# Patient Record
Sex: FEMALE | Race: WHITE | Employment: UNEMPLOYED | URBAN - METROPOLITAN AREA
[De-identification: names, ages, dates, MRNs, and addresses within clinical notes are randomized per-mention and may not be internally consistent; named-entity substitution may affect disease eponyms.]

---

## 2017-05-12 ENCOUNTER — APPOINTMENT (OUTPATIENT)
Dept: GENERAL RADIOLOGY | Facility: CLINIC | Age: 57
DRG: 189 | End: 2017-05-12
Attending: EMERGENCY MEDICINE
Payer: COMMERCIAL

## 2017-05-12 ENCOUNTER — HOSPITAL ENCOUNTER (INPATIENT)
Facility: CLINIC | Age: 57
LOS: 3 days | Discharge: HOME | DRG: 189 | End: 2017-05-15
Attending: EMERGENCY MEDICINE | Admitting: HOSPITALIST
Payer: COMMERCIAL

## 2017-05-12 DIAGNOSIS — E11.65 TYPE 2 DIABETES MELLITUS WITH HYPERGLYCEMIA, WITHOUT LONG-TERM CURRENT USE OF INSULIN (H): Primary | ICD-10-CM

## 2017-05-12 DIAGNOSIS — R09.02 HYPOXIA: ICD-10-CM

## 2017-05-12 DIAGNOSIS — J18.9 COMMUNITY ACQUIRED PNEUMONIA: ICD-10-CM

## 2017-05-12 LAB
ALBUMIN UR-MCNC: 30 MG/DL
ANION GAP SERPL CALCULATED.3IONS-SCNC: 8 MMOL/L (ref 3–14)
APPEARANCE UR: CLEAR
BASOPHILS # BLD AUTO: 0 10E9/L (ref 0–0.2)
BASOPHILS NFR BLD AUTO: 0.2 %
BILIRUB UR QL STRIP: NEGATIVE
BUN SERPL-MCNC: 10 MG/DL (ref 7–30)
CALCIUM SERPL-MCNC: 9.2 MG/DL (ref 8.5–10.1)
CHLORIDE SERPL-SCNC: 92 MMOL/L (ref 94–109)
CO2 BLDCOV-SCNC: 23 MMOL/L (ref 21–28)
CO2 SERPL-SCNC: 28 MMOL/L (ref 20–32)
COLOR UR AUTO: YELLOW
CREAT SERPL-MCNC: 0.6 MG/DL (ref 0.52–1.04)
D DIMER PPP FEU-MCNC: 0.3 UG/ML FEU (ref 0–0.5)
DIFFERENTIAL METHOD BLD: ABNORMAL
EOSINOPHIL # BLD AUTO: 0 10E9/L (ref 0–0.7)
EOSINOPHIL NFR BLD AUTO: 0 %
ERYTHROCYTE [DISTWIDTH] IN BLOOD BY AUTOMATED COUNT: 13 % (ref 10–15)
GFR SERPL CREATININE-BSD FRML MDRD: ABNORMAL ML/MIN/1.7M2
GLUCOSE BLDC GLUCOMTR-MCNC: 259 MG/DL (ref 70–99)
GLUCOSE BLDC GLUCOMTR-MCNC: 261 MG/DL (ref 70–99)
GLUCOSE BLDC GLUCOMTR-MCNC: 324 MG/DL (ref 70–99)
GLUCOSE BLDC GLUCOMTR-MCNC: 325 MG/DL (ref 70–99)
GLUCOSE BLDC GLUCOMTR-MCNC: 397 MG/DL (ref 70–99)
GLUCOSE SERPL-MCNC: 352 MG/DL (ref 70–99)
GLUCOSE UR STRIP-MCNC: >499 MG/DL
HCT VFR BLD AUTO: 34.1 % (ref 35–47)
HGB BLD-MCNC: 11.5 G/DL (ref 11.7–15.7)
HGB UR QL STRIP: NEGATIVE
IMM GRANULOCYTES # BLD: 0.4 10E9/L (ref 0–0.4)
IMM GRANULOCYTES NFR BLD: 1.5 %
INTERPRETATION ECG - MUSE: NORMAL
KETONES UR STRIP-MCNC: 20 MG/DL
LACTATE BLD-SCNC: 1.4 MMOL/L (ref 0.7–2.1)
LACTATE BLD-SCNC: 1.9 MMOL/L (ref 0.7–2.1)
LEUKOCYTE ESTERASE UR QL STRIP: ABNORMAL
LYMPHOCYTES # BLD AUTO: 1.5 10E9/L (ref 0.8–5.3)
LYMPHOCYTES NFR BLD AUTO: 6.3 %
MCH RBC QN AUTO: 29.3 PG (ref 26.5–33)
MCHC RBC AUTO-ENTMCNC: 33.7 G/DL (ref 31.5–36.5)
MCV RBC AUTO: 87 FL (ref 78–100)
MONOCYTES # BLD AUTO: 2 10E9/L (ref 0–1.3)
MONOCYTES NFR BLD AUTO: 8.7 %
MUCOUS THREADS #/AREA URNS LPF: PRESENT /LPF
NEUTROPHILS # BLD AUTO: 19.4 10E9/L (ref 1.6–8.3)
NEUTROPHILS NFR BLD AUTO: 83.3 %
NITRATE UR QL: NEGATIVE
NRBC # BLD AUTO: 0 10*3/UL
NRBC BLD AUTO-RTO: 0 /100
PCO2 BLDV: 32 MM HG (ref 40–50)
PH BLDV: 7.46 PH (ref 7.32–7.43)
PH UR STRIP: 6 PH (ref 5–7)
PLATELET # BLD AUTO: 259 10E9/L (ref 150–450)
PO2 BLDV: 48 MM HG (ref 25–47)
POTASSIUM SERPL-SCNC: 4.6 MMOL/L (ref 3.4–5.3)
PROCALCITONIN SERPL-MCNC: 1.63 NG/ML
RBC # BLD AUTO: 3.93 10E12/L (ref 3.8–5.2)
RBC #/AREA URNS AUTO: <1 /HPF (ref 0–2)
SAO2 % BLDV FROM PO2: 86 %
SODIUM SERPL-SCNC: 128 MMOL/L (ref 133–144)
SP GR UR STRIP: 1.03 (ref 1–1.03)
SQUAMOUS #/AREA URNS AUTO: <1 /HPF (ref 0–1)
TROPONIN I SERPL-MCNC: NORMAL UG/L (ref 0–0.04)
URN SPEC COLLECT METH UR: ABNORMAL
UROBILINOGEN UR STRIP-MCNC: 0 MG/DL (ref 0–2)
WBC # BLD AUTO: 23.3 10E9/L (ref 4–11)
WBC #/AREA URNS AUTO: 9 /HPF (ref 0–2)

## 2017-05-12 PROCEDURE — 85379 FIBRIN DEGRADATION QUANT: CPT | Performed by: EMERGENCY MEDICINE

## 2017-05-12 PROCEDURE — 80048 BASIC METABOLIC PNL TOTAL CA: CPT | Performed by: EMERGENCY MEDICINE

## 2017-05-12 PROCEDURE — 25000128 H RX IP 250 OP 636: Performed by: EMERGENCY MEDICINE

## 2017-05-12 PROCEDURE — 94640 AIRWAY INHALATION TREATMENT: CPT | Mod: 76

## 2017-05-12 PROCEDURE — 25000132 ZZH RX MED GY IP 250 OP 250 PS 637: Performed by: HOSPITALIST

## 2017-05-12 PROCEDURE — 40000809 ZZH STATISTIC NO DOCUMENTATION TO SUPPORT CHARGE

## 2017-05-12 PROCEDURE — 99285 EMERGENCY DEPT VISIT HI MDM: CPT | Mod: 25

## 2017-05-12 PROCEDURE — 71020 XR CHEST 2 VW: CPT

## 2017-05-12 PROCEDURE — 83605 ASSAY OF LACTIC ACID: CPT | Performed by: HOSPITALIST

## 2017-05-12 PROCEDURE — 93005 ELECTROCARDIOGRAM TRACING: CPT

## 2017-05-12 PROCEDURE — 25000131 ZZH RX MED GY IP 250 OP 636 PS 637: Performed by: HOSPITALIST

## 2017-05-12 PROCEDURE — 84145 PROCALCITONIN (PCT): CPT | Performed by: HOSPITALIST

## 2017-05-12 PROCEDURE — 40000275 ZZH STATISTIC RCP TIME EA 10 MIN

## 2017-05-12 PROCEDURE — 25000125 ZZHC RX 250: Performed by: HOSPITALIST

## 2017-05-12 PROCEDURE — 85025 COMPLETE CBC W/AUTO DIFF WBC: CPT | Performed by: EMERGENCY MEDICINE

## 2017-05-12 PROCEDURE — 83605 ASSAY OF LACTIC ACID: CPT

## 2017-05-12 PROCEDURE — 99223 1ST HOSP IP/OBS HIGH 75: CPT | Mod: AI | Performed by: HOSPITALIST

## 2017-05-12 PROCEDURE — 12000007 ZZH R&B INTERMEDIATE

## 2017-05-12 PROCEDURE — 25000132 ZZH RX MED GY IP 250 OP 250 PS 637: Performed by: EMERGENCY MEDICINE

## 2017-05-12 PROCEDURE — 87040 BLOOD CULTURE FOR BACTERIA: CPT | Performed by: HOSPITALIST

## 2017-05-12 PROCEDURE — 36415 COLL VENOUS BLD VENIPUNCTURE: CPT | Performed by: HOSPITALIST

## 2017-05-12 PROCEDURE — 00000146 ZZHCL STATISTIC GLUCOSE BY METER IP

## 2017-05-12 PROCEDURE — 94640 AIRWAY INHALATION TREATMENT: CPT

## 2017-05-12 PROCEDURE — 96375 TX/PRO/DX INJ NEW DRUG ADDON: CPT

## 2017-05-12 PROCEDURE — 25000128 H RX IP 250 OP 636: Performed by: HOSPITALIST

## 2017-05-12 PROCEDURE — 96361 HYDRATE IV INFUSION ADD-ON: CPT

## 2017-05-12 PROCEDURE — 25000125 ZZHC RX 250: Performed by: EMERGENCY MEDICINE

## 2017-05-12 PROCEDURE — 96365 THER/PROPH/DIAG IV INF INIT: CPT

## 2017-05-12 PROCEDURE — 82803 BLOOD GASES ANY COMBINATION: CPT

## 2017-05-12 PROCEDURE — 84484 ASSAY OF TROPONIN QUANT: CPT | Performed by: EMERGENCY MEDICINE

## 2017-05-12 PROCEDURE — 81001 URINALYSIS AUTO W/SCOPE: CPT | Performed by: EMERGENCY MEDICINE

## 2017-05-12 RX ORDER — SODIUM CHLORIDE 9 MG/ML
INJECTION, SOLUTION INTRAVENOUS CONTINUOUS
Status: DISCONTINUED | OUTPATIENT
Start: 2017-05-12 | End: 2017-05-13

## 2017-05-12 RX ORDER — POTASSIUM CHLORIDE 1500 MG/1
20-40 TABLET, EXTENDED RELEASE ORAL
Status: DISCONTINUED | OUTPATIENT
Start: 2017-05-12 | End: 2017-05-15 | Stop reason: HOSPADM

## 2017-05-12 RX ORDER — ACETAMINOPHEN 325 MG/1
650 TABLET ORAL EVERY 4 HOURS PRN
Status: DISCONTINUED | OUTPATIENT
Start: 2017-05-12 | End: 2017-05-15 | Stop reason: HOSPADM

## 2017-05-12 RX ORDER — GUAIFENESIN 600 MG/1
1200 TABLET, EXTENDED RELEASE ORAL 2 TIMES DAILY
Status: DISCONTINUED | OUTPATIENT
Start: 2017-05-12 | End: 2017-05-15 | Stop reason: HOSPADM

## 2017-05-12 RX ORDER — AZITHROMYCIN 250 MG/1
500 TABLET, FILM COATED ORAL ONCE
Status: COMPLETED | OUTPATIENT
Start: 2017-05-12 | End: 2017-05-12

## 2017-05-12 RX ORDER — NALOXONE HYDROCHLORIDE 0.4 MG/ML
.1-.4 INJECTION, SOLUTION INTRAMUSCULAR; INTRAVENOUS; SUBCUTANEOUS
Status: DISCONTINUED | OUTPATIENT
Start: 2017-05-12 | End: 2017-05-15 | Stop reason: HOSPADM

## 2017-05-12 RX ORDER — AZITHROMYCIN 250 MG/1
250 TABLET, FILM COATED ORAL DAILY
Status: DISCONTINUED | OUTPATIENT
Start: 2017-05-13 | End: 2017-05-15

## 2017-05-12 RX ORDER — ASPIRIN 81 MG/1
81 TABLET ORAL DAILY
Status: DISCONTINUED | OUTPATIENT
Start: 2017-05-13 | End: 2017-05-15 | Stop reason: HOSPADM

## 2017-05-12 RX ORDER — LIDOCAINE 40 MG/G
CREAM TOPICAL
Status: DISCONTINUED | OUTPATIENT
Start: 2017-05-12 | End: 2017-05-12

## 2017-05-12 RX ORDER — ASPIRIN 81 MG/1
324 TABLET, CHEWABLE ORAL ONCE
Status: COMPLETED | OUTPATIENT
Start: 2017-05-12 | End: 2017-05-12

## 2017-05-12 RX ORDER — METHOCARBAMOL 500 MG/1
500 TABLET, FILM COATED ORAL 4 TIMES DAILY PRN
Status: DISCONTINUED | OUTPATIENT
Start: 2017-05-12 | End: 2017-05-15 | Stop reason: HOSPADM

## 2017-05-12 RX ORDER — PROCHLORPERAZINE 25 MG
25 SUPPOSITORY, RECTAL RECTAL EVERY 12 HOURS PRN
Status: DISCONTINUED | OUTPATIENT
Start: 2017-05-12 | End: 2017-05-15 | Stop reason: HOSPADM

## 2017-05-12 RX ORDER — CEFTRIAXONE 2 G/1
2 INJECTION, POWDER, FOR SOLUTION INTRAMUSCULAR; INTRAVENOUS EVERY 24 HOURS
Status: DISCONTINUED | OUTPATIENT
Start: 2017-05-13 | End: 2017-05-15

## 2017-05-12 RX ORDER — HYDROMORPHONE HYDROCHLORIDE 1 MG/ML
0.5 INJECTION, SOLUTION INTRAMUSCULAR; INTRAVENOUS; SUBCUTANEOUS
Status: COMPLETED | OUTPATIENT
Start: 2017-05-12 | End: 2017-05-12

## 2017-05-12 RX ORDER — EPINEPHRINE 0.3 MG/.3ML
0.3 INJECTION SUBCUTANEOUS PRN
COMMUNITY

## 2017-05-12 RX ORDER — POTASSIUM CHLORIDE 29.8 MG/ML
20 INJECTION INTRAVENOUS
Status: DISCONTINUED | OUTPATIENT
Start: 2017-05-12 | End: 2017-05-15 | Stop reason: HOSPADM

## 2017-05-12 RX ORDER — LEVALBUTEROL INHALATION SOLUTION 1.25 MG/3ML
1.25 SOLUTION RESPIRATORY (INHALATION) 4 TIMES DAILY
Status: DISCONTINUED | OUTPATIENT
Start: 2017-05-12 | End: 2017-05-15 | Stop reason: HOSPADM

## 2017-05-12 RX ORDER — DEXTROSE MONOHYDRATE 25 G/50ML
25-50 INJECTION, SOLUTION INTRAVENOUS
Status: DISCONTINUED | OUTPATIENT
Start: 2017-05-12 | End: 2017-05-15 | Stop reason: HOSPADM

## 2017-05-12 RX ORDER — IPRATROPIUM BROMIDE AND ALBUTEROL SULFATE 2.5; .5 MG/3ML; MG/3ML
3 SOLUTION RESPIRATORY (INHALATION) ONCE
Status: COMPLETED | OUTPATIENT
Start: 2017-05-12 | End: 2017-05-12

## 2017-05-12 RX ORDER — LORATADINE 10 MG/1
10 TABLET ORAL DAILY
Status: DISCONTINUED | OUTPATIENT
Start: 2017-05-12 | End: 2017-05-15 | Stop reason: HOSPADM

## 2017-05-12 RX ORDER — NICOTINE POLACRILEX 4 MG
15-30 LOZENGE BUCCAL
Status: DISCONTINUED | OUTPATIENT
Start: 2017-05-12 | End: 2017-05-15 | Stop reason: HOSPADM

## 2017-05-12 RX ORDER — LORATADINE 10 MG/1
10 TABLET ORAL DAILY PRN
COMMUNITY

## 2017-05-12 RX ORDER — METOPROLOL SUCCINATE 100 MG/1
100 TABLET, EXTENDED RELEASE ORAL DAILY
Status: DISCONTINUED | OUTPATIENT
Start: 2017-05-12 | End: 2017-05-12

## 2017-05-12 RX ORDER — POTASSIUM CHLORIDE 7.45 MG/ML
10 INJECTION INTRAVENOUS
Status: DISCONTINUED | OUTPATIENT
Start: 2017-05-12 | End: 2017-05-15 | Stop reason: HOSPADM

## 2017-05-12 RX ORDER — ONDANSETRON 4 MG/1
4 TABLET, ORALLY DISINTEGRATING ORAL EVERY 6 HOURS PRN
Status: DISCONTINUED | OUTPATIENT
Start: 2017-05-12 | End: 2017-05-15 | Stop reason: HOSPADM

## 2017-05-12 RX ORDER — CLOTRIMAZOLE AND BETAMETHASONE DIPROPIONATE 10; .64 MG/G; MG/G
CREAM TOPICAL 2 TIMES DAILY PRN
COMMUNITY

## 2017-05-12 RX ORDER — PROCHLORPERAZINE MALEATE 5 MG
5-10 TABLET ORAL EVERY 6 HOURS PRN
Status: DISCONTINUED | OUTPATIENT
Start: 2017-05-12 | End: 2017-05-15 | Stop reason: HOSPADM

## 2017-05-12 RX ORDER — POTASSIUM CHLORIDE 1.5 G/1.58G
20-40 POWDER, FOR SOLUTION ORAL
Status: DISCONTINUED | OUTPATIENT
Start: 2017-05-12 | End: 2017-05-15 | Stop reason: HOSPADM

## 2017-05-12 RX ORDER — MAGNESIUM SULFATE HEPTAHYDRATE 40 MG/ML
4 INJECTION, SOLUTION INTRAVENOUS EVERY 4 HOURS PRN
Status: DISCONTINUED | OUTPATIENT
Start: 2017-05-12 | End: 2017-05-15 | Stop reason: HOSPADM

## 2017-05-12 RX ORDER — CEFTRIAXONE 2 G/1
2 INJECTION, POWDER, FOR SOLUTION INTRAMUSCULAR; INTRAVENOUS ONCE
Status: COMPLETED | OUTPATIENT
Start: 2017-05-12 | End: 2017-05-12

## 2017-05-12 RX ORDER — ONDANSETRON 2 MG/ML
4 INJECTION INTRAMUSCULAR; INTRAVENOUS EVERY 6 HOURS PRN
Status: DISCONTINUED | OUTPATIENT
Start: 2017-05-12 | End: 2017-05-15 | Stop reason: HOSPADM

## 2017-05-12 RX ORDER — POLYETHYLENE GLYCOL 3350 17 G/17G
17 POWDER, FOR SOLUTION ORAL DAILY PRN
Status: DISCONTINUED | OUTPATIENT
Start: 2017-05-12 | End: 2017-05-15 | Stop reason: HOSPADM

## 2017-05-12 RX ORDER — GLIPIZIDE 10 MG/1
10 TABLET ORAL
Status: DISCONTINUED | OUTPATIENT
Start: 2017-05-13 | End: 2017-05-15 | Stop reason: HOSPADM

## 2017-05-12 RX ORDER — SODIUM CHLORIDE 9 MG/ML
1000 INJECTION, SOLUTION INTRAVENOUS CONTINUOUS
Status: DISCONTINUED | OUTPATIENT
Start: 2017-05-12 | End: 2017-05-12

## 2017-05-12 RX ORDER — POTASSIUM CL/LIDO/0.9 % NACL 10MEQ/0.1L
10 INTRAVENOUS SOLUTION, PIGGYBACK (ML) INTRAVENOUS
Status: DISCONTINUED | OUTPATIENT
Start: 2017-05-12 | End: 2017-05-15 | Stop reason: HOSPADM

## 2017-05-12 RX ORDER — IPRATROPIUM BROMIDE AND ALBUTEROL SULFATE 2.5; .5 MG/3ML; MG/3ML
1 SOLUTION RESPIRATORY (INHALATION) EVERY 6 HOURS PRN
COMMUNITY

## 2017-05-12 RX ORDER — AMOXICILLIN 250 MG
1-2 CAPSULE ORAL 2 TIMES DAILY PRN
Status: DISCONTINUED | OUTPATIENT
Start: 2017-05-12 | End: 2017-05-15 | Stop reason: HOSPADM

## 2017-05-12 RX ADMIN — INSULIN ASPART 3 UNITS: 100 INJECTION, SOLUTION INTRAVENOUS; SUBCUTANEOUS at 19:41

## 2017-05-12 RX ADMIN — IPRATROPIUM BROMIDE AND ALBUTEROL SULFATE 3 ML: .5; 3 SOLUTION RESPIRATORY (INHALATION) at 12:12

## 2017-05-12 RX ADMIN — LEVALBUTEROL 1.25 MG: 1.25 SOLUTION RESPIRATORY (INHALATION) at 19:13

## 2017-05-12 RX ADMIN — LORATADINE 10 MG: 10 TABLET ORAL at 18:40

## 2017-05-12 RX ADMIN — ASPIRIN 81 MG CHEWABLE TABLET 324 MG: 81 TABLET CHEWABLE at 12:32

## 2017-05-12 RX ADMIN — SODIUM CHLORIDE: 9 INJECTION, SOLUTION INTRAVENOUS at 18:36

## 2017-05-12 RX ADMIN — ACETAMINOPHEN 650 MG: 325 TABLET, FILM COATED ORAL at 18:40

## 2017-05-12 RX ADMIN — AZITHROMYCIN 500 MG: 250 TABLET, FILM COATED ORAL at 15:22

## 2017-05-12 RX ADMIN — Medication 0.5 MG: at 12:32

## 2017-05-12 RX ADMIN — INSULIN GLARGINE 20 UNITS: 100 INJECTION, SOLUTION SUBCUTANEOUS at 21:45

## 2017-05-12 RX ADMIN — GUAIFENESIN 1200 MG: 600 TABLET, EXTENDED RELEASE ORAL at 21:44

## 2017-05-12 RX ADMIN — IPRATROPIUM BROMIDE AND ALBUTEROL SULFATE 3 ML: .5; 3 SOLUTION RESPIRATORY (INHALATION) at 17:23

## 2017-05-12 RX ADMIN — SODIUM CHLORIDE 1000 ML: 9 INJECTION, SOLUTION INTRAVENOUS at 12:32

## 2017-05-12 RX ADMIN — SODIUM CHLORIDE 500 ML: 9 INJECTION, SOLUTION INTRAVENOUS at 15:22

## 2017-05-12 RX ADMIN — CEFTRIAXONE 2 G: 2 INJECTION, POWDER, FOR SOLUTION INTRAMUSCULAR; INTRAVENOUS at 15:22

## 2017-05-12 ASSESSMENT — ENCOUNTER SYMPTOMS
COUGH: 1
VOMITING: 1
HEADACHES: 1
FEVER: 0
DIARRHEA: 1
CHILLS: 1
JOINT SWELLING: 1
SORE THROAT: 1
NAUSEA: 1

## 2017-05-12 NOTE — IP AVS SNAPSHOT
[place] [address] [facility]    [address]    [facility] [address]    Phone:  [phone]    Fax:  [phone]                                       After Visit Summary   5/12/2017    Lauren Doe    MRN: [id number]           After Visit Summary Signature Page     I have received my discharge instructions, and my questions have been answered. I have discussed any challenges I see with this plan with the nurse or doctor.    ..........................................................................................................................................  Patient/Patient Representative Signature      ..........................................................................................................................................  Patient Representative Print Name and Relationship to Patient    ..................................................               ................................................  Date                                            Time    ..........................................................................................................................................  Reviewed by Signature/Title    ...................................................              ..............................................  Date                                                            Time

## 2017-05-12 NOTE — PROVIDER NOTIFICATION
"Paged on-call MD, \"Pt temp on admission was 104.5 Axillary. Ice packs applied and Tylenol given. HR in 120-130s\"  "

## 2017-05-12 NOTE — ED PROVIDER NOTES
"  History     Chief Complaint:  Cough      The history is provided by the patient.      Lauren Doe is a 57 year old female with history of asthma and diabetes who presents with cough.  The patent reports onset of a cough a week and a half prior.  She was seen by her primary due to fluctuations in blood sugar and was started on prednisone a week prior, last dose was yesterday.  Last night around 2100 she noticed pain in her jaw and a headache and then left sided chest pain due to the persistent cough despite prednisone, inhaler, and nebulizer treatments.  Pain has persisted constantly since and blood sugar was again elevated today at 384 with nausea.  Currently she rates her pain at 10/10 in severity described as \"it's almost like a muscle is pulled\".  Pain is aggravated with movement and coughing.  Cough has been productive.  She also endorses swelling in her knees, congestion, left sided sore throat, and occasional diarrhea.  She denies measured fevers but has had chills.  Grandson staying with her is sick with recent strep and currently febrile at 100; patient was tested for strep and negative.  She denies urinary symptoms, calf swelling, or other complaint.     Per chart review patient was seen 5/1/17 and provided the prescription for prednisone.      CARDIAC RISK FACTORS:  Sex:    F  Tobacco:   Former smoker, QD 2010  Hypertension:   Pos  Hyperlipidemia:  Neg  Diabetes:   Pos  Family History:  Neg    PE/DVT RISK FACTORS:  Sex:    F  Hormones:   Neg  Tobacco:   Former smoker, QD 2010  Cancer:   Neg  Travel:   Neg  Surgery:   Neg  Other immobilization: Neg  Personal history:  Neg  Family history:  \"Maybe my mom\"     Allergies:  Bee Venom  Codeine Sulfate  Fluconazole      Medications:    Coenzyme Q10  Metformin  Glipizide  Gabapentin  Metoprolol  Fluticasone  MVI    Past Medical History:    Asthma  Diabetes  HTN  IBS  Neuropathy  PONV    Past Surgical History:    Libra  Lap tubal ligation  ЕЛЕНА & BSO    Family " "History:    History reviewed. No pertinent family history.      Social History:  Presents alone   Tobacco use: Former smoker, QD 2010  Alcohol use: 4 weekly  PCP: Emily Saldana    Marital Status:  Single       Review of Systems   Constitutional: Positive for chills. Negative for fever.   HENT: Positive for congestion and sore throat.    Respiratory: Positive for cough.    Cardiovascular: Positive for chest pain. Negative for leg swelling.   Gastrointestinal: Positive for diarrhea, nausea and vomiting.   Musculoskeletal: Positive for joint swelling.   Neurological: Positive for headaches.   All other systems reviewed and are negative.      Physical Exam     Patient Vitals for the past 24 hrs:   BP Temp Temp src Pulse Heart Rate Resp SpO2 Height Weight   05/12/17 1622 - - - - - - 93 % - -   05/12/17 1615 134/67 - - - - - - - -   05/12/17 1600 122/75 - - - - - (!) 88 % - -   05/12/17 1545 124/81 - - - - - (!) 86 % - -   05/12/17 1530 131/75 - - - - - (!) 89 % - -   05/12/17 1500 121/73 - - - - - 94 % - -   05/12/17 1445 111/63 - - - - - 94 % - -   05/12/17 1433 104/71 - - - 99 22 95 % - -   05/12/17 1430 106/63 - - - - - 96 % - -   05/12/17 1415 110/61 - - - - - 93 % - -   05/12/17 1400 102/50 - - - - - 98 % - -   05/12/17 1345 104/54 - - - - - 96 % - -   05/12/17 1330 107/56 - - - - - 95 % - -   05/12/17 1315 99/55 - - - - - 97 % - -   05/12/17 1245 118/59 - - - - - 91 % - -   05/12/17 1230 120/68 - - - - - 92 % - -   05/12/17 1215 123/70 - - - - - 95 % - -   05/12/17 1212 - - - - - - 94 % - -   05/12/17 1130 114/68 - - - - - (!) 86 % - -   05/12/17 1116 - - - - - - - 1.575 m (5' 2\") -   05/12/17 1115 147/81 97.9  F (36.6  C) Oral 125 - 20 94 % 2.108 m (6' 11\") 77.1 kg (170 lb)      Physical Exam    Nursing note and vitals reviewed.    Constitutional: Pleasant and well groomed.  Appears very uncomfortable with movement and coughing.           HENT:    Mouth/Throat: Mild erythema of posterior oropharynx.  Oral " mucosa moist.    Eyes: Conjunctivae normal are normal. No scleral icterus.    Neck: Neck supple. Small symmetric anterior cervical nodes.   Cardiovascular: Normal rate, regular rhythm and intact distal pulses.    Pulmonary/Chest: Tachypneic with diminished breath sounds throughout. No wheezing.   Abdominal: Soft.  No distension. There is no tenderness.   Musculoskeletal:  No edema, No calf tenderness, Left chest wall tenderness.       Neurological:Alert. Coordination normal.   Skin: Skin is warm and dry. No rash.  No skin lesions in area of tenderness.   Psychiatric: Normal mood and affect.        Emergency Department Course   ECG (11:18:30):  Rate 123 bpm. WY interval 128. QRS duration 82. QT/QTc 322/460. P-R-T axes 65 69 58.  Sinus tachycardia.  Otherwise normal ECG.  Agree with computer. Interpreted at 1119 by Dilcia Key MD.     Imaging:  Radiographic findings were communicated with the patient who voiced understanding of the findings.    XR Chest:  IMPRESSION: Streaky bibasilar opacity suggests atelectasis. Pneumonia could appear similar.    DENILSON ACKERMAN MD    Results per radiology.     Laboratory:  CBC: WBC 23.3 (H), HGB 11.5 (L) ow WNL ()   BMP:  (L), Chloride 92 (L), Glucose 352 (H) ow WNL (Creatinine 0.60)     Recent Labs  Lab 05/12/17  1619 05/12/17  1351 05/12/17  1215 05/12/17  1120   GLC  --   --  352*  --    * 325*  --  397*      Venous Blood Gas    Recent Labs  Lab 05/12/17  1655   PHV 7.46*   PCO2V 32*   PO2V 48*   HCO3V 23   Lactic 1.9        1215: Troponin: <0.015   D-dimer: 0.3     Interventions:  1212: Duoneb 3 mL nebulization    1232: NS 1L IV Bolus   1232: Dilaudid 0.5 mg IV  1522: Rocephin 2 g IV  1522: Azithromycin 500 mg PO  1522: NS 0.5L IV Bolus    1723: Duoneb 3 mL nebulization    The patient's symptoms were partially improved with parenteral narcotics.    Emergency Department Course:  Past medical records, nursing notes, and vitals reviewed.  1114: I  performed an exam of the patient and obtained history as documented above.   Above workup undertaken.  1430: I rechecked the patient. Explained findings to patient.  Heart rate in the 90's and blood pressure is normal.   I personally reviewed the laboratory results with the Patient and answered all related questions prior to admission.    Findings and plan explained to the Patient who consents to admission.     1700: Discussed the patient with Dr. Flores, who will admit the patient to a medical bed for further monitoring, evaluation, and treatment.      Impression & Plan    Medical Decision Making:  Lauren Doe is a 57 year old female presenting with a week and a half of cough.  It has been non productive and without measured fevers though has felt hot and cold.  At the beginning of the month, at the onset of the symptoms, she was started on a week of steroids which she reports did not improve her symptoms and she feels as though the cough has worsened.  She subsequently developed pain as described above.  The chest pain very much seems musculoskeletal though I did consider pulmonary embolism and pleurisy as well as pneumonia.  ED evaluation is as noted above, she did have significant hyperglycemia and some hyponatremia which is likely pseudohyponatremia secondary to hyperglycemia.  She was resting more comfortably but did develop hypoxia in the emergency department even prior to receiving pain medication which has persisted.  She received antibiotics and has been hemodynamically stable.  Her tachycardia improved with IV fluids.  Glucose is improving although she still has some hyperglycemia.  Due to the persistent hypoxia I have repeated nebulizer treatments and plan to admit for continued evaluation, monitoring, and management.  I have spoke with Dr. Flores who has accepted ongoing care of the patient.     Diagnosis:      ICD-10-CM    1. Community acquired pneumonia J18.9 Glucose by meter     Glucose by meter      Lactic acid whole blood     ISTAT gases lactate rhina POCT     ISTAT gases lactate rhina POCT     Procalcitonin     Blood culture     Blood culture     Lactic acid whole blood     Glucose by meter     Glucose by meter     Glucose by meter     Glucose by meter     Hemoglobin A1c     Basic metabolic panel     CBC with platelets     Glucose by meter     Glucose by meter     Glucose by meter     Glucose by meter     Magnesium     Magnesium     Glucose by meter     Glucose by meter     Glucose by meter     Glucose by meter     Glucose by meter     Glucose by meter     CBC with platelets     Glucose by meter     Glucose by meter     Lactic acid level STAT     Glucose by meter     Glucose by meter     CANCELED: Lactic acid whole blood     CANCELED: Magnesium   2. Hypoxia R09.02        Disposition:  Admitted to hospitalist service.       Aleks Garcia  5/12/2017   [facility] EMERGENCY DEPARTMENT    I, Aleks Garcia, am serving as a scribe at 11:14 AM on 5/12/2017 to document services personally performed by Dilcia Key MD based on my observations and the provider's statements to me.       Dilcia Key MD  05/14/17 1014

## 2017-05-12 NOTE — LETTER
Transition Communication Hand-off for Care Transitions to Next Level of Care Provider    Name: Lauren Doe  MRN #: [id number]  Primary Care Provider: Emily Saldana  Primary Care MD Name: Dr. Emily Saldana  Primary Clinic: [facility] [address] [place] [address]  Primary Care Clinic Name: [facility]  Reason for Hospitalization:  Community acquired pneumonia [J18.9]  Hypoxia [R09.02]  Admit Date/Time: 5/12/2017 11:09 AM  Discharge Date: 5/15/17  Payor Source: Payor: BCBS / Plan: BCBS OUT OF STATE / Product Type: Indemnity /     Readmission Assessment Measure (PERCY) Risk Score/category: LOW           Reason for Communication Hand-off Referral: Admission diagnoses: PN  Other uncontrolled DM    Discharge Plan:       Concern for non-adherence with plan of care:   NO  Discharge Needs Assessment:  Needs       Most Recent Value    Equipment Currently Used at Home none    Transportation Available car, family or friend will provide    # of Referrals Placed by [facility] Care Coordination, Scheduled Follow-up appointments          Already enrolled in Tele-monitoring program and name of program:  NA  Follow-up specialty is recommended: Yes    Follow-up plan:  No future appointments.    Any outstanding tests or procedures:              Activity      Your activity upon discharge: activity as tolerated            Diet      Follow this diet upon discharge: Orders Placed This Encounter       Combination Diet Regular Diet Adult; 8622-6784 Calories: Moderate Consistent CHO (4-6 CHO units/meal)                     Follow-up Appointments      Follow-up and recommended labs and tests       Follow up in the next week with your primary care doctor as well as diabetic educator.                     Further instructions from your care team      You have a follow up appointment on Thursday, May 18, 2017 at 9:40am with Dr. Emily Saldana at the [facility] in [place].      1. Follow  up with the diabetic educators within one week.  2. Take Lantus each night. Check your glucose three times daily and record these readings, bring these to your appointment.         Key Recommendations:  PNA, a1c 9.9 as of 5/13/17.  Needs f/u    Rosa M Carmichael [phone]  Sent via [employer] to Dr Saldana's RN CTS     AVS/Discharge Summary is the source of truth; this is a helpful guide for improved communication of patient story

## 2017-05-12 NOTE — IP AVS SNAPSHOT
MRN:[id number]                      After Visit Summary   5/12/2017    Lauren Doe    MRN: [id number]           Thank you!     Thank you for choosing [facility] for your care. Our goal is always to provide you with excellent care. Hearing back from our patients is one way we can continue to improve our services. Please take a few minutes to complete the written survey that you may receive in the mail after you visit. If you would like to speak to someone directly about your visit please contact Patient Relations at [phone]. Thank you!          Patient Information     Date Of Birth          1960        Designated Caregiver       Most Recent Value    Caregiver    Will someone help with your care after discharge? yes    Name of designated caregiver Karyn Agosto    Phone number of caregiver [phone]    Caregiver address [address] [place] [address]      About your hospital stay     You were admitted on:  May 12, 2017 You last received care in the:  [place] [address] [facility]    You were discharged on:  May 15, 2017        Reason for your hospital stay       You were hospitalized for pneumonia.                  Who to Call     For medical emergencies, please call 911.  For non-urgent questions about your medical care, please call your primary care provider or clinic, [phone]          Attending Provider     Provider Specialty    Dilcia Key MD Emergency Medicine    Mark, Eduardo Ordaz DO Internal Medicine       Primary Care Provider Office Phone # Fax #    Emily Saldana [phone]       [facility] [address] [place] [address]        After Care Instructions     Activity       Your activity upon discharge: activity as tolerated            Diet       Follow this diet upon discharge: Orders Placed This Encounter      Combination Diet Regular Diet Adult; 4178-9777 Calories: Moderate Consistent CHO  "(4-6 CHO units/meal)                  Follow-up Appointments     Follow-up and recommended labs and tests        Follow up in the next week with your primary care doctor as well as diabetic educator.                  Further instructions from your care team       You have a follow up appointment on Thursday, May 18, 2017 at 9:40am with Dr. Emily Saldana at the [facility] in [place].     1. Follow up with the diabetic educators within one week.  2. Take Lantus each night.  Check your glucose three times daily and record these readings, bring these to your appointment.    Pending Results     Date and Time Order Name Status Description    5/12/2017 1841 Blood culture Preliminary     5/12/2017 1841 Blood culture Preliminary             Statement of Approval     Ordered          05/15/17 0947  I have reviewed and agree with all the recommendations and orders detailed in this document.  EFFECTIVE NOW     Approved and electronically signed by:  Jessica Park MD             Admission Information     Date & Time Provider Department Dept. Phone    5/12/2017 Eduardo Flores DO [place] [address] [facility] [phone]      Your Vitals Were     Blood Pressure Pulse Temperature Respirations Height Weight    144/77 (BP Location: Right arm) 126 98.5  F (36.9  C) (Oral) 16 1.575 m (5' 2\") 78.3 kg (172 lb 11.2 oz)    Last Period Pulse Oximetry BMI (Body Mass Index)             08/25/2012 94% 31.59 kg/m2         [email or web address] Information     [employer] lets you send messages to your doctor, view your test results, renew your prescriptions, schedule appointments and more. To sign up, go to [email or web address] . Click on \"Log in\" on the left side of the screen, which will take you to the Welcome page. Then click on \"Sign up Now\" on the right side of the page.     You will be asked to enter the access code listed below, as well as some personal information. Please follow the directions to create your username " and password.     Your access code is: [email or web address]  Expires: 2017  1:22 PM     Your access code will  in 90 days. If you need help or a new code, please call your [facility] or [phone].        Care EveryWhere ID     This is your Care EveryWhere ID. This could be used by other organizations to access your [facility] medical records  [id number]           Review of your medicines      START taking        Dose / Directions    amoxicillin 500 MG capsule   Commonly known as:  AMOXIL   Indication:  Community Acquired Pneumonia   Used for:  Community acquired pneumonia        Dose:  500 mg   Take 1 capsule (500 mg) by mouth every 8 hours   Quantity:  11 capsule   Refills:  0       azithromycin 250 MG tablet   Commonly known as:  ZITHROMAX   Used for:  Community acquired pneumonia        Dose:  250 mg   Start taking on:  2017   Take 1 tablet (250 mg) by mouth daily for 1 day   Quantity:  1 tablet   Refills:  0       guaiFENesin 20 mg/mL Soln solution   Commonly known as:  ROBITUSSIN   Used for:  Community acquired pneumonia        Dose:  10 mL   Take 10 mLs by mouth every 4 hours as needed for cough   Quantity:  473 mL   Refills:  0       insulin glargine 100 UNIT/ML injection   Commonly known as:  LANTUS SOLOSTAR   Used for:  Type 2 diabetes mellitus with hyperglycemia, without long-term current use of insulin (H)        Dose:  25 Units   Inject 25 Units Subcutaneous At Bedtime Lantus Solostar Pen   Quantity:  9 mL   Refills:  0       insulin pen needle 31G X 8 MM   Used for:  Type 2 diabetes mellitus with hyperglycemia, without long-term current use of insulin (H)        1 Box of 100 insulin pen needles to be dispensed with every insulin pen prescription   Quantity:  100 each   Refills:  0         CONTINUE these medicines which have NOT CHANGED        Dose / Directions    albuterol 108 (90 BASE) MCG/ACT Inhaler   Commonly known as:  PROAIR HFA/PROVENTIL HFA/VENTOLIN HFA   Used for:  Community  acquired pneumonia        Dose:  2 puff   Inhale 2 puffs into the lungs every 6 hours   Quantity:  1 Inhaler   Refills:  0       clotrimazole-betamethasone cream   Commonly known as:  LOTRISONE        Apply topically 2 times daily as needed   Refills:  0       COQ10 PO        Dose:  1 tablet   Take 1 tablet by mouth every evening   Refills:  0       EPINEPHrine 0.3 MG/0.3ML injection        Dose:  0.3 mg   Inject 0.3 mg into the muscle as needed for anaphylaxis   Refills:  0       fluticasone-salmeterol 500-50 MCG/DOSE diskus inhaler   Commonly known as:  ADVAIR        Dose:  1 puff   Inhale 1 puff into the lungs every 12 hours   Refills:  0       GAS-X PO        Dose:  160 mg   Take 160 mg by mouth daily   Refills:  0       GLIPIZIDE PO        Dose:  10 mg   Take 10 mg by mouth every morning (before breakfast)   Refills:  0       hyoscyamine 0.125 MG sublingual tablet   Commonly known as:  LEVSIN/SL        Dose:  0.125 mg   Place 0.125 mg under the tongue every 4 hours as needed for cramping   Refills:  0       ibuprofen 800 MG tablet   Commonly known as:  ADVIL/MOTRIN   Used for:  S/P abdominal hysterectomy        Dose:  800 mg   Take 1 tablet by mouth every 8 hours as needed for pain.   Quantity:  30 tablet   Refills:  1       IMODIUM A-D 2 MG tablet   Generic drug:  loperamide        Dose:  2 mg   Take 2 mg by mouth 4 times daily as needed.   Refills:  0       ipratropium - albuterol 0.5 mg/2.5 mg/3 mL 0.5-2.5 (3) MG/3ML neb solution   Commonly known as:  DUONEB        Dose:  1 vial   Take 1 vial by nebulization every 6 hours as needed for shortness of breath / dyspnea or wheezing   Refills:  0       loratadine 10 MG tablet   Commonly known as:  CLARITIN        Dose:  10 mg   Take 10 mg by mouth daily as needed for allergies   Refills:  0       METFORMIN HCL PO        Dose:  1500 mg   Take 1,500 mg by mouth   Refills:  0       METHOCARBAMOL PO        Dose:  500 mg   Take 500 mg by mouth 4 times daily as needed  for muscle spasms   Refills:  0       METOPROLOL TARTRATE PO        Dose:  100 mg   Take 100 mg by mouth 2 times daily   Refills:  0       MULTIVITAMIN & MINERAL PO        Dose:  1 tablet   Take 1 tablet by mouth daily.   Refills:  0       PRAVASTATIN SODIUM PO        Dose:  20 mg   Take 20 mg by mouth every evening   Refills:  0       TYLENOL PO        Dose:  500 mg   Take 500 mg by mouth every 6 hours as needed for mild pain or fever   Refills:  0       venlafaxine 75 MG 24 hr capsule   Commonly known as:  EFFEXOR-XR        Dose:  75 mg   Take 75 mg by mouth daily   Refills:  0       VITAMIN D (CHOLECALCIFEROL) PO        Dose:  1000 Units   Take 1,000 Units by mouth daily   Refills:  0            Where to get your medicines      These medications were sent to [facility] [address] [facility]  [id number] [place] [address]     Phone:  [phone]     albuterol 108 (90 BASE) MCG/ACT Inhaler    amoxicillin 500 MG capsule    azithromycin 250 MG tablet    guaiFENesin 20 mg/mL Soln solution    insulin glargine 100 UNIT/ML injection    insulin pen needle 31G X 8 MM                Protect others around you: Learn how to safely use, store and throw away your medicines at www.disposemymeds.org.             Medication List: This is a list of all your medications and when to take them. Check marks below indicate your daily home schedule. Keep this list as a reference.      Medications           Morning Afternoon Evening Bedtime As Needed    albuterol 108 (90 BASE) MCG/ACT Inhaler   Commonly known as:  PROAIR HFA/PROVENTIL HFA/VENTOLIN HFA   Inhale 2 puffs into the lungs every 6 hours                                amoxicillin 500 MG capsule   Commonly known as:  AMOXIL   Take 1 capsule (500 mg) by mouth every 8 hours   Next Dose Due:  8pm tonight                                azithromycin 250 MG tablet   Commonly known as:  ZITHROMAX   Take 1 tablet (250 mg) by mouth daily  for 1 day   Start taking on:  5/16/2017   Last time this was given:  250 mg on 5/15/2017  8:28 AM   Next Dose Due:  Tomorrow AM                                clotrimazole-betamethasone cream   Commonly known as:  LOTRISONE   Apply topically 2 times daily as needed                                COQ10 PO   Take 1 tablet by mouth every evening                                EPINEPHrine 0.3 MG/0.3ML injection   Inject 0.3 mg into the muscle as needed for anaphylaxis                                fluticasone-salmeterol 500-50 MCG/DOSE diskus inhaler   Commonly known as:  ADVAIR   Inhale 1 puff into the lungs every 12 hours                                GAS-X PO   Take 160 mg by mouth daily                                GLIPIZIDE PO   Take 10 mg by mouth every morning (before breakfast)   Last time this was given:  10 mg on 5/15/2017  8:28 AM   Next Dose Due:  Tomorrow AM                                guaiFENesin 20 mg/mL Soln solution   Commonly known as:  ROBITUSSIN   Take 10 mLs by mouth every 4 hours as needed for cough   Last time this was given:  10 mLs on 5/14/2017 10:33 PM                                hyoscyamine 0.125 MG sublingual tablet   Commonly known as:  LEVSIN/SL   Place 0.125 mg under the tongue every 4 hours as needed for cramping                                ibuprofen 800 MG tablet   Commonly known as:  ADVIL/MOTRIN   Take 1 tablet by mouth every 8 hours as needed for pain.                                IMODIUM A-D 2 MG tablet   Take 2 mg by mouth 4 times daily as needed.   Generic drug:  loperamide                                insulin glargine 100 UNIT/ML injection   Commonly known as:  LANTUS SOLOSTAR   Inject 25 Units Subcutaneous At Bedtime Lantus Solostar Pen   Next Dose Due:  Tonight                                insulin pen needle 31G X 8 MM   1 Box of 100 insulin pen needles to be dispensed with every insulin pen prescription                                ipratropium - albuterol  0.5 mg/2.5 mg/3 mL 0.5-2.5 (3) MG/3ML neb solution   Commonly known as:  DUONEB   Take 1 vial by nebulization every 6 hours as needed for shortness of breath / dyspnea or wheezing   Last time this was given:  3 mLs on 5/12/2017  5:23 PM                                loratadine 10 MG tablet   Commonly known as:  CLARITIN   Take 10 mg by mouth daily as needed for allergies   Last time this was given:  10 mg on 5/15/2017  8:28 AM                                METFORMIN HCL PO   Take 1,500 mg by mouth                                METHOCARBAMOL PO   Take 500 mg by mouth 4 times daily as needed for muscle spasms                                METOPROLOL TARTRATE PO   Take 100 mg by mouth 2 times daily                                MULTIVITAMIN & MINERAL PO   Take 1 tablet by mouth daily.                                PRAVASTATIN SODIUM PO   Take 20 mg by mouth every evening                                TYLENOL PO   Take 500 mg by mouth every 6 hours as needed for mild pain or fever   Last time this was given:  650 mg on 5/13/2017  4:03 PM                                venlafaxine 75 MG 24 hr capsule   Commonly known as:  EFFEXOR-XR   Take 75 mg by mouth daily                                VITAMIN D (CHOLECALCIFEROL) PO   Take 1,000 Units by mouth daily                                          More Information        Insulin Glargine Solution for injection  What is this medicine?  INSULIN GLARGINE (IN chavez gómez GLAR geen) is a human-made form of insulin. This drug lowers the amount of sugar in your blood. It is a long-acting insulin that is usually given once a day.  This medicine may be used for other purposes; ask your health care provider or pharmacist if you have questions.  What should I tell my health care provider before I take this medicine?  They need to know if you have any of these conditions:    episodes of hypoglycemia    kidney disease    liver disease    an unusual or allergic reaction to insulin,  metacresol, other medicines, foods, dyes, or preservatives    pregnant or trying to get pregnant    breast-feeding  How should I use this medicine?  This medicine is for injection under the skin. Use this medicine at the same time each day. Use exactly as directed. This insulin should never be mixed in the same syringe with other insulins before injection. Do not vigorously shake before use. You will be taught how to use this medicine and how to adjust doses for activities and illness. Do not use more insulin than prescribed.  Always check the appearance of your insulin before using it. This medicine should be clear and colorless like water. Do not use it if it is cloudy, thickened, colored, or has solid particles in it.  It is important that you put your used needles and syringes in a special sharps container. Do not put them in a trash can. If you do not have a sharps container, call your pharmacist or healthcare provider to get one.  Talk to your pediatrician regarding the use of this medicine in children. Special care may be needed.  Overdosage: If you think you have taken too much of this medicine contact a poison control center or emergency room at once.  NOTE: This medicine is only for you. Do not share this medicine with others.  What if I miss a dose?  It is important not to miss a dose. Your health care professional or doctor should discuss a plan for missed doses with you. If you do miss a dose, follow their plan. Do not take double doses.  What may interact with this medicine?    other medicines for diabetes  Many medications may cause an increase or decrease in blood sugar, these include:    alcohol containing beverages    aspirin and aspirin-like drugs    chloramphenicol    chromium    diuretics    female hormones, like estrogens or progestins and birth control pills    heart medicines    isoniazid    male hormones or anabolic steroids    medicines for weight loss    medicines for allergies, asthma,  cold, or cough    medicines for mental problems    medicines called MAO Inhibitors like Nardil, Parnate, Marplan, Eldepryl    niacin    NSAIDs, medicines for pain and inflammation, like ibuprofen or naproxen    pentamidine    phenytoin    probenecid    quinolone antibiotics like ciprofloxacin, levofloxacin, ofloxacin    some herbal dietary supplements    steroid medicines like prednisone or cortisone    thyroid medicine  Some medications can hide the warning symptoms of low blood sugar. You may need to monitor your blood sugar more closely if you are taking one of these medications. These include:    beta-blockers such as atenolol, metoprolol, propranolol    clonidine    guanethidine    reserpine  This list may not describe all possible interactions. Give your health care provider a list of all the medicines, herbs, non-prescription drugs, or dietary supplements you use. Also tell them if you smoke, drink alcohol, or use illegal drugs. Some items may interact with your medicine.  What should I watch for while using this medicine?  Visit your health care professional or doctor for regular checks on your progress.  Do not drive, use machinery, or do anything that needs mental alertness until you know how this medicine affects you. Alcohol may interfere with the effect of this medicine. Avoid alcoholic drinks.  A test called the HbA1C (A1C) will be monitored. This is a simple blood test. It measures your blood sugar control over the last 2 to 3 months. You will receive this test every 3 to 6 months.  Learn how to check your blood sugar. Learn the symptoms of low and high blood sugar and how to manage them.  Always carry a quick-source of sugar with you in case you have symptoms of low blood sugar. Examples include hard sugar candy or glucose tablets. Make sure others know that you can choke if you eat or drink when you develop serious symptoms of low blood sugar, such as seizures or unconsciousness. They must get  medical help at once.  Tell your doctor or health care professional if you have high blood sugar. You might need to change the dose of your medicine. If you are sick or exercising more than usual, you might need to change the dose of your medicine.  Do not skip meals. Ask your doctor or health care professional if you should avoid alcohol. Many nonprescription cough and cold products contain sugar or alcohol. These can affect blood sugar.  Make sure that you have the right kind of syringe for the type of insulin you use. Try not to change the brand and type of insulin or syringe unless your health care professional or doctor tells you to. Switching insulin brand or type can cause dangerously high or low blood sugar. Always keep an extra supply of insulin, syringes, and needles on hand. Use a syringe one time only. Throw away syringe and needle in a closed container to prevent accidental needle sticks.  Insulin pens and cartridges should never be shared. Even if the needle is changed, sharing may result in passing of viruses like hepatitis or HIV.  Wear a medical ID bracelet or chain, and carry a card that describes your disease and details of your medicine and dosage times.  What side effects may I notice from receiving this medicine?  Side effects that you should report to your health care professional or doctor as soon as possible:    allergic reactions like skin rash, itching or hives, swelling of the face, lips, or tongue    breathing problems    signs and symptoms of high blood sugar such as dizziness, dry mouth, dry skin, fruity breath, nausea, stomach pain, increased hunger or thirst, increased urination    signs and symptoms of low blood sugar such as feeling anxious, confusion, dizziness, increased hunger, unusually weak or tired, sweating, shakiness, cold, irritable, headache, blurred vision, fast heartbeat, loss of consciousness  Side effects that usually do not require medical attention (report to your  health care professional or doctor if they continue or are bothersome):    increase or decrease in fatty tissue under the skin due to overuse of a particular injection site    itching, burning, swelling, or rash at site where injected  This list may not describe all possible side effects. Call your doctor for medical advice about side effects. You may report side effects to FDA at 2-558-JVL-7306.  Where should I keep my medicine?  Keep out of the reach of children.  Store unopened vials in a refrigerator between 2 and 8 degrees C (36 and 46 degrees F). Do not freeze or use if the insulin has been frozen. Opened vials (vials currently in use) may be stored in the refrigerator or at room temperature, at approximately 25 degrees C (77 degrees F) or cooler. Keeping your insulin at room temperature decreases the amount of pain during injection. Once opened, your insulin can be used for 28 days. After 28 days, the vial should be thrown away.  Store Lantus Solostar Pens or Basaglar KwikPens in a refrigerator between 2 and 8 degrees C (36 and 46 degrees F) or at room temperature below 30 degrees C (86 degrees F). Do not freeze or use if the insulin has been frozen. Once opened, the pens should be kept at room temperature. Do not store in the refrigerator once opened. Once opened, the insulin can be used for 28 days. After 28 days, the Lantus Solostar Pen or Basaglar KwikPen should be thrown away.  Store Toujeo Solostar Pens in a refrigerator between 2 and 8 degrees C (36 and 46 degrees F). Do not freeze or use if the insulin has been frozen. Once opened, the pens should be kept at room temperature below 30 degrees C (86 degrees F). Do not store in the refrigerator once opened. Once opened, the insulin can be used for 42 days. After 42 days, the Toujeo Solostar Pen should be thrown away.  Protect all insulin vials and pens from light and excessive heat. Throw away any unused medicine after the expiration date or after the  specified time for room temperature storage has passed.  NOTE: This sheet is a summary. It may not cover all possible information. If you have questions about this medicine, talk to your doctor, pharmacist, or health care provider.  NOTE:This sheet is a summary. It may not cover all possible information. If you have questions about this medicine, talk to your doctor, pharmacist, or health care provider. Copyright  2016 [employer]

## 2017-05-12 NOTE — ED NOTES
Patient comes in for evaluation for chest pain and high blood glucose. Patient notes she has had a cough for about a week and was started on prednisone at that time. Still has cough and had chest pain which started last night and is worse when she coughs. Notes her blood sugar at home this AM was 384.

## 2017-05-12 NOTE — LETTER
[place] [address] [facility]  [address] [facility] [address]  [phone]          May 15, 2017    RE:  Lauren Doe                                                                                                                                                       [address] [address]            To whom it may concern:    Lauren Doe was hospitalized from 5/12/17 through 05/15/17.      Sincerely,          Jessica Park MD

## 2017-05-12 NOTE — H&P
HISTORY AND PHYSICAL     PRIMARY CARE PHYSICIAN:  Emily Saldana MD.      CHIEF COMPLAINT:  Shortness of breath.      HISTORY OF PRESENT ILLNESS:  Lauren Doe is a 57-year-old female with a history of asthma, non-insulin-dependent diabetes mellitus and hypertension presenting to [facility] for evaluation of cough and shortness of breath.  Patient indicates that her symptoms started about 10 days ago.  She first noticed a nonproductive cough and mild shortness of breath.  She was seen in clinic and thought to have a component of reactive airway disease and probable viral upper respiratory tract infection.  She was started on a several day course of prednisone but no antibiotics.  The patient's symptoms continued throughout the course of the past 10 days with significant exacerbation in her glycemic control up persistently into the 300 range.  She also complains of some mild back pain.  She complains of some left-sided flank and chest pain only in the context of coughing.  No exertional shortness of breath or chest pain or pressure.  She denies any objective fevers but does report some chills and sweats.  She has a roommate and her grandson is also staying with her who has been ill with streptococcal pharyngitis.  Due to all these issues she came to the Emergency Department for evaluation today.      Upon presentation the patient had a temperature of 97.9, heart rate in the 120s, respiratory rate in 16 to 20 range and blood pressure 147/81.  Oxygen saturation was initially in the mid 90s on room air but subsequently dropped into the mid 80s.  Basic metabolic panel showed a sodium of 128.  Troponin and D-dimer were normal.  Blood sugars were checked multiple times and always in the 300 range.  CBC shows a white blood cell count of 23,000 with hemoglobin 11.5 and normal platelets.  Venous blood gas shows pH of 7.46, pCO2 of 32 and bicarb of 23.  Lactate is 1.9.  Urinalysis shows glucosuria and 9 white  blood cells with trace leukocyte esterase.  Chest x-ray shows streaky bibasilar opacities suggestive of atelectasis versus pneumonia.  EKG shows sinus tachycardia with a rate of 123 and a QTc of 460 with no appreciable ST or T-wave abnormalities.  The patient was given some IV fluids, 325 mg of oral aspirin, azithromycin, ceftriaxone and a nebulizer treatments.  She feels mildly better but remained somewhat hypoxic with ambulation and tachycardic.  She is being admitted for further management.  The patient has no other complaints at this time.      PAST MEDICAL HISTORY:   1.  Asthma versus COPD.   2.  History of tobacco dependent, has not smoked in about 7 years.   3.  Hypertension.   4.  Non-insulin dependent diabetes mellitus.   5.  History of tubal ligation.   6.  History of cholecystectomy.   7.  Peripheral neuropathy.   8.  IBS.      PTA MEDICATIONS:    Prior to Admission medications    Medication Sig Last Dose Taking? Auth Provider   fluticasone-salmeterol (ADVAIR) 500-50 MCG/DOSE diskus inhaler Inhale 1 puff into the lungs every 12 hours 5/11/2017 at pm Yes Unknown, Entered By History   METOPROLOL TARTRATE PO Take 100 mg by mouth 2 times daily 5/12/2017 at am Yes Unknown, Entered By History   VITAMIN D, CHOLECALCIFEROL, PO Take 1,000 Units by mouth daily 5/12/2017 at am Yes Unknown, Entered By History   EPINEPHrine 0.3 MG/0.3ML injection Inject 0.3 mg into the muscle as needed for anaphylaxis  Yes Unknown, Entered By History   Acetaminophen (TYLENOL PO) Take 500 mg by mouth every 6 hours as needed for mild pain or fever  at prn Yes Unknown, Entered By History   clotrimazole-betamethasone (LOTRISONE) cream Apply topically 2 times daily as needed  at prn Yes Unknown, Entered By History   ipratropium - albuterol 0.5 mg/2.5 mg/3 mL (DUONEB) 0.5-2.5 (3) MG/3ML neb solution Take 1 vial by nebulization every 6 hours as needed for shortness of breath / dyspnea or wheezing  at prn Yes Unknown, Entered By History    hyoscyamine (LEVSIN/SL) 0.125 MG sublingual tablet Place 0.125 mg under the tongue every 4 hours as needed for cramping  at prn Yes Unknown, Entered By History   Simethicone (GAS-X PO) Take 160 mg by mouth daily 5/12/2017 at Unknown time Yes Unknown, Entered By History   PRAVASTATIN SODIUM PO Take 20 mg by mouth every evening 5/11/2017 at pm Yes Unknown, Entered By History   METHOCARBAMOL PO Take 500 mg by mouth 4 times daily as needed for muscle spasms  at prn Yes Unknown, Entered By History   loratadine (CLARITIN) 10 MG tablet Take 10 mg by mouth daily as needed for allergies Past Month at Unknown time Yes Unknown, Entered By History   Coenzyme Q10 (COQ10 PO) Take 1 tablet by mouth every evening  5/11/2017 at Unknown time Yes Reported, Patient   METFORMIN HCL PO Take 1,500 mg by mouth  5/12/2017 at am Yes Reported, Patient   GLIPIZIDE PO Take 10 mg by mouth every morning (before breakfast)  5/12/2017 at am Yes Reported, Patient   ibuprofen (ADVIL,MOTRIN) 800 MG tablet Take 1 tablet by mouth every 8 hours as needed for pain.  at prn Yes Baron Cabrera MD   albuterol (PROVENTIL HFA: VENTOLIN HFA) 108 (90 BASE) MCG/ACT inhaler Inhale 2 puffs into the lungs every 6 hours.   Past Month at Unknown time Yes Reported, Patient   Multiple Vitamins-Minerals (MULTIVITAMIN & MINERAL PO) Take 1 tablet by mouth daily. 5/12/2017 at Unknown time Yes Reported, Patient   loperamide (IMODIUM A-D) 2 MG tablet Take 2 mg by mouth 4 times daily as needed. 5/12/2017 at Unknown time Yes Reported, Patient   DICLOFENAC SODIUM PO Take 75 mg by mouth 2 times daily   Unknown, Entered By History     SOCIAL HISTORY:  The patient lives with a roommate.  She is not .  She quit smoking about 7 years ago.  She admits to occasional but not regular alcohol use.  No drug use.      FAMILY HISTORY:  Assessed and noncontributory.      ALLERGIES:   1.  Bee venom.   2.  Codeine.   3.  Fluconazole.      REVIEW OF SYSTEMS:  A 10-point review  of systems was performed and the pertinent positive findings are presented in history present illness.  The remainder review of systems is negative.      PHYSICAL EXAMINATION:   VITAL SIGNS:  Temperature 97.9, heart rate 125, blood pressure 147/81, respiratory rate 20, oxygen saturation 94% on 2 liters nasal cannula.   GENERAL:  In no apparent distress, awake, alert and oriented x3.   HEENT:  Normocephalic, atraumatic, extraocular movements are intact.   NECK:  Supple without JVD.   CARDIOVASCULAR:  Tachycardic but regular.  No murmurs, rubs or gallops.   PULMONARY:  Faint crackles with adequate air movement and no wheezing.   ABDOMINAL:  Soft, nontender, nondistended, bowel sounds are present.   EXTREMITIES:  No cyanosis, clubbing or edema.   SKIN:  No rashes, ulcers or jaundice.   NEUROLOGICAL:  Cranial nerves II through XII are grossly intact.  No focal neurologic deficits.   PSYCHIATRIC:  Mood and affect are appropriate.      LABORATORY/IMAGING/ EKG:  Personally reviewed and discussed pertinent findings in the HPI.      ASSESSMENT AND PLAN:  This is a 57-year-old female with a history of asthma versus COPD, previous tobacco dependence, hypertension, diabetes mellitus presenting to [facility] for evaluation of cough and shortness of breath.   1.  Suspected community-acquired pneumonia:  Patient's chest x-ray shows streaky bibasilar opacities concerning for atelectasis versus pneumonia.  This is in the context of a leukocytosis    of 23.3 and now a newly documented fever of 101.4.  She has had fairly refractory symptoms over the past 10 days despite steroid treatment.  She has not received any antibiotics.  Her pneumonia could be viral versus bacterial.  Given her failure to improve we will continue ceftriaxone and azithromycin.  Check some blood cultures as well as a procalcitonin level.   2.  Acute hypoxic respiratory failure:  Patient is mildly hypoxic in the emergency department.  She probably  has some degree of chronic lung obstructive disease related to her asthma versus COPD.  Nevertheless her current respiratory issues are compounding the issue.  She is currently stable    on 2 liters of oxygen and we will titrate this as needed.  Her D-dimer is negative so a CT is of low utility at this point and PE seems rather unlikely.  She currently does not have any evidence of wheezing or bronchospastic airway so we will hold off on any further prednisone.  We will continue nebulizer treatments using Xopenex due to her tachycardia.   3.  Non-insulin dependent type 2 diabetes mellitus:  Patient has been significantly hyperglycemic over the past several days due to steroid use.  She is not on insulin at home.  We will start 20 units of Lantus tonight as well as a high sliding scale insulin and 2 units of NovoLog per carb unit with meals.  We will hold her metformin due to risk of lactic acidosis with nebulizers.  We will resume her glipizide as well,     4.  Hypertension:  We will continue the patient's Toprol-XL at 100 mg daily.   5.  Sinus tachycardia:  Likely due to nebulizers as well as clinical dehydration and respiratory distress.  We will watch her closely on telemetry.  Troponin is undetectable and this seems o risk for any cardiac issues.   6.  The patient admitted to inpatient status with an expected 2-midnight hospitalization.   7.  CODE STATUS:  Full code as discussed with patient.         SYDNEE MOYA MD        **Now with high grade fever and ongoing tachycardia concerning for sepsis. Lactate checked and stable. Continue plan as noted above, STAT BCx     D: 2017 17:56   T: 2017 18:42   MT: [id number]      Name:     WILLIAMS THORNTON   MRN:      [id number]        Account:      [id number]   :      1960           Admitted:     [id number]      Document: [id number]

## 2017-05-13 LAB
ANION GAP SERPL CALCULATED.3IONS-SCNC: 7 MMOL/L (ref 3–14)
BUN SERPL-MCNC: 6 MG/DL (ref 7–30)
CALCIUM SERPL-MCNC: 9.1 MG/DL (ref 8.5–10.1)
CHLORIDE SERPL-SCNC: 101 MMOL/L (ref 94–109)
CO2 SERPL-SCNC: 27 MMOL/L (ref 20–32)
CREAT SERPL-MCNC: 0.46 MG/DL (ref 0.52–1.04)
ERYTHROCYTE [DISTWIDTH] IN BLOOD BY AUTOMATED COUNT: 13.4 % (ref 10–15)
GFR SERPL CREATININE-BSD FRML MDRD: ABNORMAL ML/MIN/1.7M2
GLUCOSE BLDC GLUCOMTR-MCNC: 202 MG/DL (ref 70–99)
GLUCOSE BLDC GLUCOMTR-MCNC: 222 MG/DL (ref 70–99)
GLUCOSE BLDC GLUCOMTR-MCNC: 252 MG/DL (ref 70–99)
GLUCOSE BLDC GLUCOMTR-MCNC: 262 MG/DL (ref 70–99)
GLUCOSE BLDC GLUCOMTR-MCNC: 284 MG/DL (ref 70–99)
GLUCOSE SERPL-MCNC: 220 MG/DL (ref 70–99)
HBA1C MFR BLD: 9.9 % (ref 4.3–6)
HCT VFR BLD AUTO: 31.4 % (ref 35–47)
HGB BLD-MCNC: 10.4 G/DL (ref 11.7–15.7)
MAGNESIUM SERPL-MCNC: 2.1 MG/DL (ref 1.6–2.3)
MCH RBC QN AUTO: 29.5 PG (ref 26.5–33)
MCHC RBC AUTO-ENTMCNC: 33.1 G/DL (ref 31.5–36.5)
MCV RBC AUTO: 89 FL (ref 78–100)
PLATELET # BLD AUTO: 214 10E9/L (ref 150–450)
POTASSIUM SERPL-SCNC: 3.6 MMOL/L (ref 3.4–5.3)
RBC # BLD AUTO: 3.53 10E12/L (ref 3.8–5.2)
SODIUM SERPL-SCNC: 135 MMOL/L (ref 133–144)
WBC # BLD AUTO: 17.9 10E9/L (ref 4–11)

## 2017-05-13 PROCEDURE — 83735 ASSAY OF MAGNESIUM: CPT | Performed by: INTERNAL MEDICINE

## 2017-05-13 PROCEDURE — 25000125 ZZHC RX 250: Performed by: HOSPITALIST

## 2017-05-13 PROCEDURE — 25000132 ZZH RX MED GY IP 250 OP 250 PS 637: Performed by: HOSPITALIST

## 2017-05-13 PROCEDURE — 36415 COLL VENOUS BLD VENIPUNCTURE: CPT | Performed by: HOSPITALIST

## 2017-05-13 PROCEDURE — 12000007 ZZH R&B INTERMEDIATE

## 2017-05-13 PROCEDURE — 83735 ASSAY OF MAGNESIUM: CPT | Performed by: HOSPITALIST

## 2017-05-13 PROCEDURE — 80048 BASIC METABOLIC PNL TOTAL CA: CPT | Performed by: HOSPITALIST

## 2017-05-13 PROCEDURE — 99233 SBSQ HOSP IP/OBS HIGH 50: CPT | Performed by: INTERNAL MEDICINE

## 2017-05-13 PROCEDURE — 25000132 ZZH RX MED GY IP 250 OP 250 PS 637: Performed by: INTERNAL MEDICINE

## 2017-05-13 PROCEDURE — 25000128 H RX IP 250 OP 636: Performed by: HOSPITALIST

## 2017-05-13 PROCEDURE — 25000131 ZZH RX MED GY IP 250 OP 636 PS 637: Performed by: HOSPITALIST

## 2017-05-13 PROCEDURE — 40000275 ZZH STATISTIC RCP TIME EA 10 MIN

## 2017-05-13 PROCEDURE — 83036 HEMOGLOBIN GLYCOSYLATED A1C: CPT | Performed by: HOSPITALIST

## 2017-05-13 PROCEDURE — 99207 ZZC MOONLIGHTING INDICATOR: CPT | Performed by: INTERNAL MEDICINE

## 2017-05-13 PROCEDURE — 94640 AIRWAY INHALATION TREATMENT: CPT

## 2017-05-13 PROCEDURE — 00000146 ZZHCL STATISTIC GLUCOSE BY METER IP

## 2017-05-13 PROCEDURE — 85027 COMPLETE CBC AUTOMATED: CPT | Performed by: HOSPITALIST

## 2017-05-13 PROCEDURE — 94640 AIRWAY INHALATION TREATMENT: CPT | Mod: 76

## 2017-05-13 RX ORDER — VENLAFAXINE HYDROCHLORIDE 75 MG/1
75 CAPSULE, EXTENDED RELEASE ORAL DAILY
COMMUNITY

## 2017-05-13 RX ORDER — VENLAFAXINE HYDROCHLORIDE 75 MG/1
75 TABLET, EXTENDED RELEASE ORAL DAILY
Status: DISCONTINUED | OUTPATIENT
Start: 2017-05-13 | End: 2017-05-15 | Stop reason: HOSPADM

## 2017-05-13 RX ADMIN — LORATADINE 10 MG: 10 TABLET ORAL at 08:33

## 2017-05-13 RX ADMIN — ACETAMINOPHEN 650 MG: 325 TABLET, FILM COATED ORAL at 16:03

## 2017-05-13 RX ADMIN — LEVALBUTEROL 1.25 MG: 1.25 SOLUTION RESPIRATORY (INHALATION) at 11:15

## 2017-05-13 RX ADMIN — GUAIFENESIN 1200 MG: 600 TABLET, EXTENDED RELEASE ORAL at 21:24

## 2017-05-13 RX ADMIN — ASPIRIN 81 MG: 81 TABLET, COATED ORAL at 08:33

## 2017-05-13 RX ADMIN — CEFTRIAXONE 2 G: 2 INJECTION, POWDER, FOR SOLUTION INTRAMUSCULAR; INTRAVENOUS at 14:07

## 2017-05-13 RX ADMIN — ACETAMINOPHEN 650 MG: 325 TABLET, FILM COATED ORAL at 05:25

## 2017-05-13 RX ADMIN — LEVALBUTEROL 1.25 MG: 1.25 SOLUTION RESPIRATORY (INHALATION) at 16:08

## 2017-05-13 RX ADMIN — FLUTICASONE FUROATE AND VILANTEROL TRIFENATATE 1 PUFF: 200; 25 POWDER RESPIRATORY (INHALATION) at 07:21

## 2017-05-13 RX ADMIN — VENLAFAXINE HYDROCHLORIDE 75 MG: 75 TABLET, EXTENDED RELEASE ORAL at 12:16

## 2017-05-13 RX ADMIN — INSULIN ASPART 2 UNITS: 100 INJECTION, SOLUTION INTRAVENOUS; SUBCUTANEOUS at 12:16

## 2017-05-13 RX ADMIN — LEVALBUTEROL 1.25 MG: 1.25 SOLUTION RESPIRATORY (INHALATION) at 07:21

## 2017-05-13 RX ADMIN — AZITHROMYCIN 250 MG: 250 TABLET, FILM COATED ORAL at 08:33

## 2017-05-13 RX ADMIN — LEVALBUTEROL 1.25 MG: 1.25 SOLUTION RESPIRATORY (INHALATION) at 20:06

## 2017-05-13 RX ADMIN — GLIPIZIDE 10 MG: 10 TABLET ORAL at 08:33

## 2017-05-13 RX ADMIN — GUAIFENESIN 1200 MG: 600 TABLET, EXTENDED RELEASE ORAL at 08:33

## 2017-05-13 RX ADMIN — INSULIN ASPART 3 UNITS: 100 INJECTION, SOLUTION INTRAVENOUS; SUBCUTANEOUS at 17:48

## 2017-05-13 RX ADMIN — INSULIN GLARGINE 20 UNITS: 100 INJECTION, SOLUTION SUBCUTANEOUS at 21:30

## 2017-05-13 RX ADMIN — INSULIN ASPART 2 UNITS: 100 INJECTION, SOLUTION INTRAVENOUS; SUBCUTANEOUS at 08:33

## 2017-05-13 NOTE — DISCHARGE INSTRUCTIONS
You have a follow up appointment on Thursday, May 18, 2017 at 9:40am with Dr. Emily Saldana at the [facility] in [place].     1. Follow up with the diabetic educators within one week.  2. Take Lantus each night.  Check your glucose three times daily and record these readings, bring these to your appointment.

## 2017-05-13 NOTE — PLAN OF CARE
Problem: Goal Outcome Summary  Goal: Goal Outcome Summary  Outcome: Improving  Ambulatory Status:  Pt up with SBA   VS: tachycardic   Pain:  Some pain with coughing   Resp: LS diminished with fine crackles; requires 1L NC at times   GI:  denies nausea.  Good appetite and on regular diet.  BS audible.  Passing flatus.  Last BM 5/12.  :  WDL  Skin:  Tattoos, scattered bruising   Tx:  IV zosyn, PO Zithromax, nebs     Labs:  K 3.6, A1c 9.9, WBL 17.9;  and 222  Disposition:  TBD

## 2017-05-13 NOTE — PHARMACY-ADMISSION MEDICATION HISTORY
Admission medication history interview status for this patient is ALMOST complete. See EPIC admission navigator for allergy information, prior to admission medications and immunization status.     Medication history interview source(s):Patient  Medication history resources (including written lists, pill bottles, clinic record):None  Primary pharmacy:[facility]    Changes made to PTA medication list:  Added: vitamin D, epi-pen, tylenol, lotrisone, duoneb, levsin, gas-x, pravastatin, methocarbamol  Deleted: valtrex, gabapentin, prednisone, aspirin  Changed: metoprolol from succinate to tartate.     Actions taken by pharmacist (provider contacted, etc):None     Additional medication history information:Pt is unsure of 2 meds, pharmacy will call the pt's pharmacy in the AM to clarify if taking diclofenac and a med that starts with a v for hot flashes.     Medication reconciliation/reorder completed by provider prior to medication history? Yes    For patients on insulin therapy: No (Yes/No)    Time spent in this activity: 20 min    Prior to Admission medications    Medication Sig Last Dose Taking? Auth Provider   fluticasone-salmeterol (ADVAIR) 500-50 MCG/DOSE diskus inhaler Inhale 1 puff into the lungs every 12 hours 5/11/2017 at pm Yes Unknown, Entered By History   METOPROLOL TARTRATE PO Take 100 mg by mouth 2 times daily 5/12/2017 at am Yes Unknown, Entered By History   VITAMIN D, CHOLECALCIFEROL, PO Take 1,000 Units by mouth daily 5/12/2017 at am Yes Unknown, Entered By History   EPINEPHrine 0.3 MG/0.3ML injection Inject 0.3 mg into the muscle as needed for anaphylaxis  Yes Unknown, Entered By History   Acetaminophen (TYLENOL PO) Take 500 mg by mouth every 6 hours as needed for mild pain or fever  at prn Yes Unknown, Entered By History   clotrimazole-betamethasone (LOTRISONE) cream Apply topically 2 times daily as needed  at prn Yes Unknown, Entered By History   ipratropium - albuterol 0.5 mg/2.5 mg/3 mL  (DUONEB) 0.5-2.5 (3) MG/3ML neb solution Take 1 vial by nebulization every 6 hours as needed for shortness of breath / dyspnea or wheezing  at prn Yes Unknown, Entered By History   hyoscyamine (LEVSIN/SL) 0.125 MG sublingual tablet Place 0.125 mg under the tongue every 4 hours as needed for cramping  at prn Yes Unknown, Entered By History   Simethicone (GAS-X PO) Take 160 mg by mouth daily 5/12/2017 at Unknown time Yes Unknown, Entered By History   PRAVASTATIN SODIUM PO Take 20 mg by mouth every evening 5/11/2017 at pm Yes Unknown, Entered By History   METHOCARBAMOL PO Take 500 mg by mouth 4 times daily as needed for muscle spasms  at prn Yes Unknown, Entered By History   loratadine (CLARITIN) 10 MG tablet Take 10 mg by mouth daily as needed for allergies Past Month at Unknown time Yes Unknown, Entered By History   Coenzyme Q10 (COQ10 PO) Take 1 tablet by mouth every evening  5/11/2017 at Unknown time Yes Reported, Patient   METFORMIN HCL PO Take 1,500 mg by mouth  5/12/2017 at am Yes Reported, Patient   GLIPIZIDE PO Take 10 mg by mouth every morning (before breakfast)  5/12/2017 at am Yes Reported, Patient   ibuprofen (ADVIL,MOTRIN) 800 MG tablet Take 1 tablet by mouth every 8 hours as needed for pain.  at prn Yes Baron Cabrera MD   albuterol (PROVENTIL HFA: VENTOLIN HFA) 108 (90 BASE) MCG/ACT inhaler Inhale 2 puffs into the lungs every 6 hours.   Past Month at Unknown time Yes Reported, Patient   Multiple Vitamins-Minerals (MULTIVITAMIN & MINERAL PO) Take 1 tablet by mouth daily. 5/12/2017 at Unknown time Yes Reported, Patient   loperamide (IMODIUM A-D) 2 MG tablet Take 2 mg by mouth 4 times daily as needed. 5/12/2017 at Unknown time Yes Reported, Patient   DICLOFENAC SODIUM PO Take 75 mg by mouth 2 times daily   Unknown, Entered By History

## 2017-05-13 NOTE — PHARMACY-ADMISSION MEDICATION HISTORY
Admission medication history interview status for this patient is complete. See [facility] admission navigator for allergy information, prior to admission medications and immunization status.     Medication history interview source(s):Patient  Medication history resources (including written lists, pill bottles, clinic record):None  Primary pharmacy:[facility]     Changes made to PTA medication list:  Added: vitamin D, Epi pen, Lotrisone cream, [place], Duoneb, Hyoscyamine, Simethicone, Pravastatin, Glipizide, Metoprolol, Venlafaxine  Deleted: valacyclovir, prednisone, aspirin, diclofenac   Changed: none     Actions taken by pharmacist (provider contacted, etc):None     Additional medication history information:None    Medication reconciliation/reorder completed by provider prior to medication history? No    For patients on insulin therapy: No (Yes/No)  Lantus/levemir/NPH/Mix 70/30 dose:  _____   in AM/PM  or twice daily   Sliding scale Novolog Y/N  If Yes, do you have a baseline novolog pre-meal dose:  ______units with meals   Patients eat three meals a day:   Y/N     Any Barriers to therapy:  cost of medications/comfortable with giving injections (if applicable)/ comfortable and confident with current diabetes regimen       Prior to Admission medications    Medication Sig Last Dose Taking? Auth Provider   venlafaxine (EFFEXOR-XR) 75 MG 24 hr capsule Take 75 mg by mouth daily 5/12/2017 at Unknown time Yes Unknown, Entered By History   fluticasone-salmeterol (ADVAIR) 500-50 MCG/DOSE diskus inhaler Inhale 1 puff into the lungs every 12 hours 5/11/2017 at pm Yes Unknown, Entered By History   METOPROLOL TARTRATE PO Take 100 mg by mouth 2 times daily 5/12/2017 at am Yes Unknown, Entered By History   VITAMIN D, CHOLECALCIFEROL, PO Take 1,000 Units by mouth daily 5/12/2017 at am Yes Unknown, Entered By History   EPINEPHrine 0.3 MG/0.3ML injection Inject 0.3 mg into the muscle as needed for anaphylaxis  Yes Unknown,  Entered By History   Acetaminophen (TYLENOL PO) Take 500 mg by mouth every 6 hours as needed for mild pain or fever  at prn Yes Unknown, Entered By History   clotrimazole-betamethasone (LOTRISONE) cream Apply topically 2 times daily as needed  at prn Yes Unknown, Entered By History   ipratropium - albuterol 0.5 mg/2.5 mg/3 mL (DUONEB) 0.5-2.5 (3) MG/3ML neb solution Take 1 vial by nebulization every 6 hours as needed for shortness of breath / dyspnea or wheezing  at prn Yes Unknown, Entered By History   hyoscyamine (LEVSIN/SL) 0.125 MG sublingual tablet Place 0.125 mg under the tongue every 4 hours as needed for cramping  at prn Yes Unknown, Entered By History   Simethicone (GAS-X PO) Take 160 mg by mouth daily 5/12/2017 at Unknown time Yes Unknown, Entered By History   PRAVASTATIN SODIUM PO Take 20 mg by mouth every evening 5/11/2017 at pm Yes Unknown, Entered By History   METHOCARBAMOL PO Take 500 mg by mouth 4 times daily as needed for muscle spasms  at prn Yes Unknown, Entered By History   loratadine (CLARITIN) 10 MG tablet Take 10 mg by mouth daily as needed for allergies Past Month at Unknown time Yes Unknown, Entered By History   Coenzyme Q10 (COQ10 PO) Take 1 tablet by mouth every evening  5/11/2017 at Unknown time Yes Reported, Patient   METFORMIN HCL PO Take 1,500 mg by mouth  5/12/2017 at am Yes Reported, Patient   GLIPIZIDE PO Take 10 mg by mouth every morning (before breakfast)  5/12/2017 at am Yes Reported, Patient   ibuprofen (ADVIL,MOTRIN) 800 MG tablet Take 1 tablet by mouth every 8 hours as needed for pain.  at prn Yes Baron Cabrera MD   albuterol (PROVENTIL HFA: VENTOLIN HFA) 108 (90 BASE) MCG/ACT inhaler Inhale 2 puffs into the lungs every 6 hours.   Past Month at Unknown time Yes Reported, Patient   Multiple Vitamins-Minerals (MULTIVITAMIN & MINERAL PO) Take 1 tablet by mouth daily. 5/12/2017 at Unknown time Yes Reported, Patient   loperamide (IMODIUM A-D) 2 MG tablet Take 2 mg by mouth  4 times daily as needed. 5/12/2017 at Unknown time Yes Reported, Patient

## 2017-05-13 NOTE — PROGRESS NOTES
[facility]  Hospitalist Progress Note  Sean Evans MD 05/13/2017             Assessment and Plan:        This is a 57-year-old female with a history of asthma versus COPD, previous tobacco dependence, hypertension, diabetes mellitus presenting to [facility] for evaluation of cough and shortness of breath.     1. Suspected community-acquired pneumonia: Patient's chest x-ray shows streaky bibasilar opacities concerning for atelectasis versus pneumonia. This is in the context of a leukocytosis   of 23.3 and  fever of 101.4. She has had fairly refractory symptoms over the past 10 days despite steroid treatment. She has not received any antibiotics. Her pneumonia could be viral versus bacterial. Given her failure to improve we will continue ceftriaxone and azithromycin.  WBC decreased today.   2. Acute hypoxic respiratory failure: Patient was mildly hypoxic in the emergency department. She probably has some degree of chronic lung obstructive disease related to her asthma versus COPD. Nevertheless her current respiratory issues are compounding the issue. She is currently stable   on 2 liters of oxygen and we will titrate this as needed.  She currently does not have any evidence of wheezing or bronchospastic airway so we will hold off on any further prednisone. We will continue nebulizer treatments using Xopenex due to her tachycardia.   3. Non-insulin dependent type 2 diabetes mellitus: Patient has been significantly hyperglycemic over the past several days due to steroid use. She is not on insulin at home. Started 20 units of Lantus as well as a high sliding scale insulin and 2 units of NovoLog per carb unit with meals. We will hold her metformin due to risk of lactic acidosis with nebulizers. We will resume her glipizide as well,   4. Hypertension: We will continue the patient's Toprol-XL at 100 mg daily.   5. Sinus tachycardia: Likely due to nebulizers as well as clinical dehydration  "and respiratory distress. We will watch her closely on telemetry. Troponin is undetectable and this seems o risk for any cardiac issues.   6. D/C plan to home - 1-2  Days   7. CODE STATUS: Full code         Interval History (Subjective):      Still coughing, no fever. No wheezing. Chest pain with coughing                  Physical Exam:      Last Vital Signs:  /73  Pulse 126  Temp 98.1  F (36.7  C) (Oral)  Resp 16  Ht 1.575 m (5' 2\")  Wt 78.3 kg (172 lb 11.2 oz)  LMP 08/25/2012  SpO2 95%  BMI 31.59 kg/m2      Intake/Output Summary (Last 24 hours) at 05/13/17 1142  Last data filed at 05/13/17 0900   Gross per 24 hour   Intake             2342 ml   Output                0 ml   Net             2342 ml       Constitutional: Awake, alert, cooperative, no apparent distress   Respiratory: Clear to auscultation bilaterally, basilar fine crackles and mild wheezing bilateral    Cardiovascular: Regular rate and rhythm, normal S1 and S2, and no murmur noted   Abdomen: Normal bowel sounds, soft, non-distended, non-tender   Skin: No rashes, no cyanosis, dry to touch   Neuro: Alert and oriented x3, no weakness, numbness, memory loss   Extremities: No edema, normal range of motion   Other(s):        All other systems: Negative          Medications:      All current medications were reviewed with changes reflected in problem list.         Data:      All new lab and imaging data was reviewed.   Labs:    Recent Labs  Lab 05/13/17  0619 05/12/17  1215   WBC 17.9* 23.3*   HGB 10.4* 11.5*   HCT 31.4* 34.1*   MCV 89 87    259       Recent Labs  Lab 05/13/17  0619 05/12/17  1215    128*   POTASSIUM 3.6 4.6   CHLORIDE 101 92*   CO2 27 28   ANIONGAP 7 8   * 352*   BUN 6* 10   CR 0.46* 0.60   GFRESTIMATED >90Non  GFR Calc >90Non  GFR Calc   GFRESTBLACK >90African American GFR Calc >90African American GFR Calc   ERENDIRA 9.1 9.2   MAG 2.1  --       Imaging:   Recent Results (from the " past 24 hour(s))   XR Chest 2 Views    Narrative    XR CHEST 2 VW 5/12/2017 12:57 PM    HISTORY: Chest pain.    COMPARISON: None.    FINDINGS: Mild streaky basilar opacity could reflect atelectasis or  pneumonia. No pleural effusion or pneumothorax. Normal heart size.      Impression    IMPRESSION: Streaky bibasilar opacity suggests atelectasis. Pneumonia  could appear similar.    DENILSON ACKERMAN MD

## 2017-05-13 NOTE — CONSULTS
Care Transition Initial Assessment - RN    Reason For Consult: care coordination/care conference, discharge planning   Met with: Patient.    DATA   Active Problems:    CAP (community acquired pneumonia)     PNA Action Plan provided to patient and discussed at bedside.     Cognitive Status: awake, alert and oriented.  Primary Care Clinic Name: [facility]  Primary Care MD Name: Dr. Emily Saldana  Contact information and PCP information verified: Yes    Lives With: significant other  Living Arrangements: house     Description of Support System: Supportive, Involved   Who is your support system?: Significant Other, Children         Insurance concerns: No Insurance issues identified    ASSESSMENT  Patient currently receives the following services:  None        Identified issues/concerns regarding health management: Patient is currently on short term disability for a torn rotator cuff in her left shoulder. She is being treatment conservatively without surgery. Discussed importance of completing the whole course of antibiotics after discharge, even if feeling well.     PLAN  Financial costs for the patient were not discussed.  Patient given options and choices for discharge.  Patient/family is agreeable to the plan?  Yes: Home with antibiotics.   Patient anticipates discharging back to home.     Patient anticipates needs for home equipment: No    Plan/Disposition: Home     Appointments: Thursday, May 18, 2017 at 9:40am with Dr. Emily Saldana ([facility])    CM will continue to follow patient until discharge for any additional needs.     Leonila Curry RN, BSN, CTS  [facility] [place]  [phone]

## 2017-05-13 NOTE — PLAN OF CARE
Problem: Goal Outcome Summary  Goal: Goal Outcome Summary  Outcome: No Change  Max temp 100.2 Tylenol x 1 given. Tachy, currently on 1L NC. Other vital signs stable.   Denies any pain/nausea.   Up with SBA calls approp.   IVF /hr. Continues Rocephin and oral Zithromax.   , Lactic 1.4  On tele monitoring.  Will continue to monitor and provide supportive care.

## 2017-05-13 NOTE — PROGRESS NOTES
SPIRITUAL HEALTH SERVICES Progress Note  Pending sale to [facility]    Attempted several times to visit per pt request.  Pt was on phone or sleeping all times.  Will refer to Sunday on-call  for follow-up.    Tadeo Hayes  Chaplain Resident  [phone]

## 2017-05-13 NOTE — PLAN OF CARE
"Problem: Goal Outcome Summary  Goal: Goal Outcome Summary  Outcome: No Change  /60  Pulse 126  Temp 100.5  F (38.1  C) (Axillary)  Resp 22  Ht 1.575 m (5' 2\")  Wt 78.3 kg (172 lb 11.2 oz)  LMP 08/25/2012  SpO2 94%  BMI 31.59 kg/m2  Neuro: A&O x4, pleasant and cooperative with cares  Pain: denies pain  Resp: LS diminished with crackles to bases, on 1L O2 via NC, on scheduled Nebs  Cardiac: Tele: ST, HR 110s to 120s  GI/: Bowel sounds positive, voiding appropriately, Had one loose BM, Hx IBS  Diet: tolerating regular diet, poor appetite,   Skin/mobility: intact, some tattoos, and bruises to arms, up with SBA   Tx:  IV zithro and Rocephin, continuous NS at 150mL/Hr  Spiked a fever 104 Axillary on admission, tylenol given and ice packs applied, fever slowly reduced, last result 100.5, Lactic 1.4, BC pending    Will continue to monitor and provide supportive care.             "

## 2017-05-14 LAB
ERYTHROCYTE [DISTWIDTH] IN BLOOD BY AUTOMATED COUNT: 13.3 % (ref 10–15)
GLUCOSE BLDC GLUCOMTR-MCNC: 221 MG/DL (ref 70–99)
GLUCOSE BLDC GLUCOMTR-MCNC: 229 MG/DL (ref 70–99)
GLUCOSE BLDC GLUCOMTR-MCNC: 253 MG/DL (ref 70–99)
GLUCOSE BLDC GLUCOMTR-MCNC: 266 MG/DL (ref 70–99)
GLUCOSE BLDC GLUCOMTR-MCNC: 309 MG/DL (ref 70–99)
HCT VFR BLD AUTO: 34 % (ref 35–47)
HGB BLD-MCNC: 10.6 G/DL (ref 11.7–15.7)
LACTATE BLD-SCNC: 0.9 MMOL/L (ref 0.7–2.1)
MCH RBC QN AUTO: 28.3 PG (ref 26.5–33)
MCHC RBC AUTO-ENTMCNC: 31.2 G/DL (ref 31.5–36.5)
MCV RBC AUTO: 91 FL (ref 78–100)
PLATELET # BLD AUTO: 216 10E9/L (ref 150–450)
RBC # BLD AUTO: 3.74 10E12/L (ref 3.8–5.2)
WBC # BLD AUTO: 12.4 10E9/L (ref 4–11)

## 2017-05-14 PROCEDURE — 94640 AIRWAY INHALATION TREATMENT: CPT | Mod: 76

## 2017-05-14 PROCEDURE — 25000132 ZZH RX MED GY IP 250 OP 250 PS 637: Performed by: HOSPITALIST

## 2017-05-14 PROCEDURE — 85027 COMPLETE CBC AUTOMATED: CPT | Performed by: INTERNAL MEDICINE

## 2017-05-14 PROCEDURE — 25000132 ZZH RX MED GY IP 250 OP 250 PS 637: Performed by: INTERNAL MEDICINE

## 2017-05-14 PROCEDURE — 25000128 H RX IP 250 OP 636: Performed by: HOSPITALIST

## 2017-05-14 PROCEDURE — 99233 SBSQ HOSP IP/OBS HIGH 50: CPT | Performed by: INTERNAL MEDICINE

## 2017-05-14 PROCEDURE — 36415 COLL VENOUS BLD VENIPUNCTURE: CPT | Performed by: INTERNAL MEDICINE

## 2017-05-14 PROCEDURE — 12000007 ZZH R&B INTERMEDIATE

## 2017-05-14 PROCEDURE — 25000125 ZZHC RX 250: Performed by: HOSPITALIST

## 2017-05-14 PROCEDURE — 40000275 ZZH STATISTIC RCP TIME EA 10 MIN

## 2017-05-14 PROCEDURE — 83605 ASSAY OF LACTIC ACID: CPT | Performed by: HOSPITALIST

## 2017-05-14 PROCEDURE — 94640 AIRWAY INHALATION TREATMENT: CPT

## 2017-05-14 PROCEDURE — 25000131 ZZH RX MED GY IP 250 OP 636 PS 637: Performed by: INTERNAL MEDICINE

## 2017-05-14 PROCEDURE — 00000146 ZZHCL STATISTIC GLUCOSE BY METER IP

## 2017-05-14 RX ADMIN — FLUTICASONE FUROATE AND VILANTEROL TRIFENATATE 1 PUFF: 200; 25 POWDER RESPIRATORY (INHALATION) at 07:18

## 2017-05-14 RX ADMIN — GUAIFENESIN 1200 MG: 600 TABLET, EXTENDED RELEASE ORAL at 21:29

## 2017-05-14 RX ADMIN — LEVALBUTEROL 1.25 MG: 1.25 SOLUTION RESPIRATORY (INHALATION) at 19:36

## 2017-05-14 RX ADMIN — CEFTRIAXONE 2 G: 2 INJECTION, POWDER, FOR SOLUTION INTRAMUSCULAR; INTRAVENOUS at 14:22

## 2017-05-14 RX ADMIN — LEVALBUTEROL 1.25 MG: 1.25 SOLUTION RESPIRATORY (INHALATION) at 07:18

## 2017-05-14 RX ADMIN — INSULIN ASPART 2 UNITS: 100 INJECTION, SOLUTION INTRAVENOUS; SUBCUTANEOUS at 09:43

## 2017-05-14 RX ADMIN — GUAIFENESIN 1200 MG: 600 TABLET, EXTENDED RELEASE ORAL at 08:26

## 2017-05-14 RX ADMIN — VENLAFAXINE HYDROCHLORIDE 75 MG: 75 TABLET, EXTENDED RELEASE ORAL at 08:26

## 2017-05-14 RX ADMIN — INSULIN ASPART 3 UNITS: 100 INJECTION, SOLUTION INTRAVENOUS; SUBCUTANEOUS at 12:32

## 2017-05-14 RX ADMIN — AZITHROMYCIN 250 MG: 250 TABLET, FILM COATED ORAL at 08:26

## 2017-05-14 RX ADMIN — LEVALBUTEROL 1.25 MG: 1.25 SOLUTION RESPIRATORY (INHALATION) at 15:03

## 2017-05-14 RX ADMIN — GLIPIZIDE 10 MG: 10 TABLET ORAL at 08:26

## 2017-05-14 RX ADMIN — GUAIFENESIN 10 ML: 100 SOLUTION ORAL at 22:33

## 2017-05-14 RX ADMIN — ASPIRIN 81 MG: 81 TABLET, COATED ORAL at 08:26

## 2017-05-14 RX ADMIN — INSULIN GLARGINE 25 UNITS: 100 INJECTION, SOLUTION SUBCUTANEOUS at 21:37

## 2017-05-14 RX ADMIN — INSULIN ASPART 3 UNITS: 100 INJECTION, SOLUTION INTRAVENOUS; SUBCUTANEOUS at 17:31

## 2017-05-14 RX ADMIN — LORATADINE 10 MG: 10 TABLET ORAL at 08:26

## 2017-05-14 RX ADMIN — LEVALBUTEROL 1.25 MG: 1.25 SOLUTION RESPIRATORY (INHALATION) at 11:14

## 2017-05-14 NOTE — PROGRESS NOTES
SPIRITUAL HEALTH SERVICES Progress Note  [facility] Med/Surg 501    Attempted to see Lauren per intake request. At each attempt Lauren was either on the phone or unavailable. Chaplains will continue to attempt.    Doug Arriaga  Chaplain Resident  Pager [phone]

## 2017-05-14 NOTE — PLAN OF CARE
Problem: Goal Outcome Summary  Goal: Goal Outcome Summary  Outcome: Improving  Ambulatory Status:  Pt up independently; ambulated in hallway with family   VS: tachycardic   Pain:  Some pain with cough   Resp: LS diminished; productive cough   GI:  Denies nausea.  Good appetite and on mod carb diet.  BS active.  Passing flatus.  Last BM 5/14.  :  WDL  Skin:  WDL, tattoos, bruises   Tx:  IV zithromax, rocephin, nebs, monitoring oxygen levels   Labs:  WBC 12.4;  and 253  Disposition:  TBD, potential d/c 5/15

## 2017-05-14 NOTE — PLAN OF CARE
Problem: Goal Outcome Summary  Goal: Goal Outcome Summary  Outcome: Improving  Pt up SBA  A&O x4  Denies pain  TMAX:  99.1; tachy  LS dim.  Occ nonproductive cough.  1L O2  Hypo BS.  Passing flatus.  Denies n/v  B  Voiding

## 2017-05-14 NOTE — PLAN OF CARE
Problem: Goal Outcome Summary  Goal: Goal Outcome Summary  Outcome: No Change  HR tachycardic, otherwise VSS.  Denies SOB, but does have some productive coughing c/o of chest muscle pain with cough.  Given tylenol x1.  Lungs diminished, on 1 L oxygen, sats decrease while sleeping.  Up stand by assist to bathroom.  Tolerating a regular diet,  and 284.   Continue abx, and nebs.

## 2017-05-15 VITALS
RESPIRATION RATE: 16 BRPM | BODY MASS INDEX: 31.78 KG/M2 | TEMPERATURE: 98.5 F | DIASTOLIC BLOOD PRESSURE: 77 MMHG | HEART RATE: 126 BPM | HEIGHT: 62 IN | SYSTOLIC BLOOD PRESSURE: 144 MMHG | OXYGEN SATURATION: 94 % | WEIGHT: 172.7 LBS

## 2017-05-15 LAB
GLUCOSE BLDC GLUCOMTR-MCNC: 211 MG/DL (ref 70–99)
GLUCOSE BLDC GLUCOMTR-MCNC: 221 MG/DL (ref 70–99)
GLUCOSE BLDC GLUCOMTR-MCNC: 266 MG/DL (ref 70–99)
LACTATE BLD-SCNC: 1.1 MMOL/L (ref 0.7–2.1)

## 2017-05-15 PROCEDURE — 25000132 ZZH RX MED GY IP 250 OP 250 PS 637: Performed by: INTERNAL MEDICINE

## 2017-05-15 PROCEDURE — 36415 COLL VENOUS BLD VENIPUNCTURE: CPT | Performed by: INTERNAL MEDICINE

## 2017-05-15 PROCEDURE — 25000132 ZZH RX MED GY IP 250 OP 250 PS 637: Performed by: HOSPITALIST

## 2017-05-15 PROCEDURE — 40000275 ZZH STATISTIC RCP TIME EA 10 MIN

## 2017-05-15 PROCEDURE — 00000146 ZZHCL STATISTIC GLUCOSE BY METER IP

## 2017-05-15 PROCEDURE — 83605 ASSAY OF LACTIC ACID: CPT | Performed by: INTERNAL MEDICINE

## 2017-05-15 PROCEDURE — 25000125 ZZHC RX 250: Performed by: HOSPITALIST

## 2017-05-15 PROCEDURE — 99239 HOSP IP/OBS DSCHRG MGMT >30: CPT | Performed by: INTERNAL MEDICINE

## 2017-05-15 PROCEDURE — 94640 AIRWAY INHALATION TREATMENT: CPT

## 2017-05-15 RX ORDER — AMOXICILLIN 500 MG/1
500 CAPSULE ORAL EVERY 8 HOURS
Qty: 11 CAPSULE | Refills: 0 | Status: SHIPPED | OUTPATIENT
Start: 2017-05-15

## 2017-05-15 RX ORDER — ALBUTEROL SULFATE 90 UG/1
2 AEROSOL, METERED RESPIRATORY (INHALATION) EVERY 6 HOURS
Qty: 1 INHALER | Refills: 0 | Status: SHIPPED | OUTPATIENT
Start: 2017-05-15

## 2017-05-15 RX ORDER — AMOXICILLIN 500 MG/1
500 CAPSULE ORAL EVERY 8 HOURS SCHEDULED
Status: DISCONTINUED | OUTPATIENT
Start: 2017-05-15 | End: 2017-05-15 | Stop reason: HOSPADM

## 2017-05-15 RX ORDER — AZITHROMYCIN 250 MG/1
250 TABLET, FILM COATED ORAL DAILY
Qty: 1 TABLET | Refills: 0 | Status: SHIPPED | OUTPATIENT
Start: 2017-05-16 | End: 2017-05-17

## 2017-05-15 RX ADMIN — LORATADINE 10 MG: 10 TABLET ORAL at 08:28

## 2017-05-15 RX ADMIN — FLUTICASONE FUROATE AND VILANTEROL TRIFENATATE 1 PUFF: 200; 25 POWDER RESPIRATORY (INHALATION) at 08:01

## 2017-05-15 RX ADMIN — LEVALBUTEROL 1.25 MG: 1.25 SOLUTION RESPIRATORY (INHALATION) at 08:01

## 2017-05-15 RX ADMIN — INSULIN ASPART 3 UNITS: 100 INJECTION, SOLUTION INTRAVENOUS; SUBCUTANEOUS at 12:07

## 2017-05-15 RX ADMIN — GUAIFENESIN 1200 MG: 600 TABLET, EXTENDED RELEASE ORAL at 08:28

## 2017-05-15 RX ADMIN — ASPIRIN 81 MG: 81 TABLET, COATED ORAL at 08:28

## 2017-05-15 RX ADMIN — VENLAFAXINE HYDROCHLORIDE 75 MG: 75 TABLET, EXTENDED RELEASE ORAL at 08:27

## 2017-05-15 RX ADMIN — GLIPIZIDE 10 MG: 10 TABLET ORAL at 08:28

## 2017-05-15 RX ADMIN — AMOXICILLIN 500 MG: 500 CAPSULE ORAL at 13:51

## 2017-05-15 RX ADMIN — INSULIN ASPART 2 UNITS: 100 INJECTION, SOLUTION INTRAVENOUS; SUBCUTANEOUS at 08:27

## 2017-05-15 RX ADMIN — AZITHROMYCIN 250 MG: 250 TABLET, FILM COATED ORAL at 08:28

## 2017-05-15 NOTE — PLAN OF CARE
Problem: Goal Outcome Summary  Goal: Goal Outcome Summary  Outcome: Improving  VSS; Tachy 96. T max 98.9. LS diminished. BS x4. Pt denies pain and nausea. Mod CHO diet, blood sugar during night was 211. PIV - SL. Getting IV Rocephin. Independently ambulating. A&O, calls appropriately for assistance. Encouraging use of IS. Intermittent cough, no meds given. WBC 12.4, BC pending. Plan to DC home today.

## 2017-05-15 NOTE — PROGRESS NOTES
IV removed. Belongings with pt in room. Discharge instructions and medications reviewed with pt. Pt to ambulate independently out of hospital when her ride arrives around 1500.

## 2017-05-15 NOTE — PROGRESS NOTES
SPIRITUAL HEALTH SERVICES  SPIRITUAL ASSESSMENT Progress Note  [facility] Med[facility]urg [facility]    PRIMARY FOCUS:     Emotional/spiritual/Sikh distress    Support for coping    ILLNESS CIRCUMSTANCES:   Reviewed documentation. Reflective conversation shared with Lauren which integrated elements of illness and family narratives.     Context of Serious Illness/Symptom(s) - Lauren is in the hospital with pneumonia, she stated that she feels much better than she had on admission.    Persons/Resources Involved - Her family, children and grandchildren, are all involved and supportive. They came and visited yesterday for mothers day    DISTRESS:     Emotional/Existential/Relational Distress - none expressed    Spiritual/Buddhism Distress - none expressed    Social/Cultural/Economic Distress - none expressed    SPIRIT (Coping):     Rastafari/Avis - Cristhian and attend [employer] in [place]    Spiritual Practice(s) - Prayer is a very important part of Lauren's life. Gadly welcomed prayer.    Emotional/Existential/Relational Connections - Not discussed    SENSE-MAKING:    Goals of Care - To be discharged later today, pending discussion with doctor    Meaning/Sense-Making - not specifically stated.     PLAN: [facility] will continue to be available per patient family staff request.      Doug Arriaga  Chaplain Resident  Pager [phone]

## 2017-05-15 NOTE — PHARMACY-ADMISSION MEDICATION HISTORY
Lauren   Nader     1960      female    [id number]                                [phone]    Allergy: Bee venom; Codeine sulfate; and Fluconazole    RX: Discharge Medication Consult by Pharmacist  EDUCATION:   Discharge Medication List   Lauren Doe   Home Medication Instructions SAYRA:[id number]    Printed on:05/15/17 1058   Medication Information                      Acetaminophen (TYLENOL PO)  Take 500 mg by mouth every 6 hours as needed for mild pain or fever             albuterol (PROAIR HFA/PROVENTIL HFA/VENTOLIN HFA) 108 (90 BASE) MCG/ACT Inhaler  Inhale 2 puffs into the lungs every 6 hours             amoxicillin (AMOXIL) 500 MG capsule  Take 1 capsule (500 mg) by mouth every 8 hours             azithromycin (ZITHROMAX) 250 MG tablet  Take 1 tablet (250 mg) by mouth daily for 1 day             clotrimazole-betamethasone (LOTRISONE) cream  Apply topically 2 times daily as needed             Coenzyme Q10 (COQ10 PO)  Take 1 tablet by mouth every evening              EPINEPHrine 0.3 MG/0.3ML injection  Inject 0.3 mg into the muscle as needed for anaphylaxis             fluticasone-salmeterol (ADVAIR) 500-50 MCG/DOSE diskus inhaler  Inhale 1 puff into the lungs every 12 hours             GLIPIZIDE PO  Take 10 mg by mouth every morning (before breakfast)              guaiFENesin (ROBITUSSIN) 20 mg/mL SOLN solution  Take 10 mLs by mouth every 4 hours as needed for cough             hyoscyamine (LEVSIN/SL) 0.125 MG sublingual tablet  Place 0.125 mg under the tongue every 4 hours as needed for cramping             ibuprofen (ADVIL,MOTRIN) 800 MG tablet  Take 1 tablet by mouth every 8 hours as needed for pain.             insulin glargine (LANTUS SOLOSTAR) 100 UNIT/ML injection  Inject 25 Units Subcutaneous At Bedtime Lantus Solostar Pen             insulin pen needle 31G X 8 MM  1 Box of 100 insulin pen needles to be dispensed with every insulin pen prescription             ipratropium - albuterol 0.5 mg/2.5  mg/3 mL (DUONEB) 0.5-2.5 (3) MG/3ML neb solution  Take 1 vial by nebulization every 6 hours as needed for shortness of breath / dyspnea or wheezing             loperamide (IMODIUM A-D) 2 MG tablet  Take 2 mg by mouth 4 times daily as needed.             loratadine (CLARITIN) 10 MG tablet  Take 10 mg by mouth daily as needed for allergies             METFORMIN HCL PO  Take 1,500 mg by mouth              METHOCARBAMOL PO  Take 500 mg by mouth 4 times daily as needed for muscle spasms             METOPROLOL TARTRATE PO  Take 100 mg by mouth 2 times daily             Multiple Vitamins-Minerals (MULTIVITAMIN & MINERAL PO)  Take 1 tablet by mouth daily.             PRAVASTATIN SODIUM PO  Take 20 mg by mouth every evening             Simethicone (GAS-X PO)  Take 160 mg by mouth daily             venlafaxine (EFFEXOR-XR) 75 MG 24 hr capsule  Take 75 mg by mouth daily             VITAMIN D, CHOLECALCIFEROL, PO  Take 1,000 Units by mouth daily                 Patient was informed to STOP taking the following HOME medications:   none    Patient was informed to start taking the following NEW medications:   Amoxicillin, azithromycin, guaifenesin, insulin glargine    Patient was educated on the following for each discharge medication:  Rationale for therapy  Duration of treatment  Dosing and or monitoring drug levels  Common side effects  Importance of compliance  Drug/food interactions  Missed doses  Self monitoring parameters    OUTCOMES:  Patient verbalized understanding    Left written materials and instructions (Clinical notes from EPIC) - amoxicillin, insulin glargine.  IMPORTANT FOLLOW UP NOTES:   Follow up in the next week with your primary care doctor as well as diabetic educator.

## 2017-05-15 NOTE — PLAN OF CARE
"Problem: Goal Outcome Summary  Goal: Goal Outcome Summary  Outcome: No Change  /71 (BP Location: Right arm)  Pulse 126  Temp 97.7  F (36.5  C) (Oral)  Resp 16  Ht 1.575 m (5' 2\")  Wt 78.3 kg (172 lb 11.2 oz)  LMP 08/25/2012  SpO2 96%  BMI 31.59 kg/m2  Neuro: A&O x4, pleasant and coopertive with cares  Pain: denies pain  Resp: LS diminished, using IS 1250, reports dry cough, PRN robitussin administered  Cardiac: Tele: ST 110s to 120s, BP WDL  GI/:  Bowel sounds positive, had BM, passing gas, voiding approrpiatly  Diet: tolerating mo carb diet  Skin/mobility: bruises and tattoos, up independently in room  Tx:  IV zithro and rocephin, scheduled nebs, Lantus increased d/t high BG  Will continue to monitor and provide supportive care.             "

## 2017-05-15 NOTE — DISCHARGE SUMMARY
[facility]  Discharge Summary  Name: Lauren Doe    MRN: [id number]  YOB: 1960    Age: 57 year old  Date of Discharge:  5/15/2017  Date of Admission: 5/12/2017  Primary Care Provider: Emily Saldana  Discharge Physician:  Jessica Park MD  Discharging Service:  Hospitalist      Discharge Diagnoses:  1. CAP  2. Acute Hypoxic Respiratory Failure  3. DM2 with Hyperglycemia  4. HTN  5. Sinus Tachycardia  6. Depression     Hospital Course:  Lauren Doe is a 57 year old female with past medical history of asthma, DM2 and HTN who was admitted on 5/12/2017 with cough and SOB for approximately 10 days. She was treated for possible RAD and viral URI with steroids in the outpatient setting but did not improve. She was found to have likely CAP on admission and did improve with antibiotics. Has also had significant hyperglycemia with elevated HgbA1C and started on insulin this admission.     1. Community-acquired pneumonia: Patient's chest x-ray shows streaky bibasilar opacities concerning for atelectasis versus pneumonia. This is in the context of a leukocytosis (23.3) and fever. She has had fairly refractory symptoms over the past 10 days despite steroid treatment. She has not received any antibiotics prior to this admission.  She was started on ceftriaxone and azithromycin. Procalcitonin level was elevated to 1.63. Her blood cultures have remained negative. She slowly improved during admission and will complete a total of 7 days of antibiotics with Azithromycin and Amoxicillin at discharge.  She will also continue her PTA inhalers/nebulizers.    2. Acute hypoxic respiratory failure: Patient was mildly hypoxic in the emergency department. She probably has some degree of chronic lung obstructive disease related to her asthma versus COPD. Nevertheless her current respiratory issues are compounding the issue. Her D-dimer was negative so a CT is of low utility at this point and PE seems rather  "unlikely. With treatment of PNA hypoxia resolved prior to discharge.     3. Non-insulin dependent type 2 diabetes mellitus with poor control: Patient has been significantly hyperglycemic over the past several days due to steroid use. She is not on insulin at home. PTA she was on Metformin and Glipizide. Her HgbA1C was elevated to 9.9 so she had poor control prior to steroids. She was started on Lantus on admission and will continue with this at discharge (25 units QHS).  She will continue her Metformin and Glipizide.  She will follow up with her PCP and diabetic educators this coming week.  Instructed to check her glucose TID and bring the results to her follow up appointment.     4. Hypertension: We will continue the patient's Toprol-XL at 100 mg daily.      5. Sinus tachycardia: Likely due to nebulizers as well as clinical dehydration and respiratory distress. Troponin is undetectable and this seems low risk for any cardiac issues.      6. Depression: Continue Effexor.     Discharge Disposition:  Discharged to home     Allergies:  Allergies   Allergen Reactions     Bee Venom Anaphylaxis     Codeine Sulfate Nausea and Vomiting     Fluconazole Nausea        Condition on Discharge:  Discharge condition: Good   Discharge vitals: Blood pressure 144/77, pulse 126, temperature 98.5  F (36.9  C), temperature source Oral, resp. rate 16, height 1.575 m (5' 2\"), weight 78.3 kg (172 lb 11.2 oz), last menstrual period 08/25/2012, SpO2 94 %, not currently breastfeeding.   Code status on discharge: Full Code     History of Illness:  See detailed admission note for full details.    Physical Exam:  Blood pressure 144/77, pulse 126, temperature 98.5  F (36.9  C), temperature source Oral, resp. rate 16, height 1.575 m (5' 2\"), weight 78.3 kg (172 lb 11.2 oz), last menstrual period 08/25/2012, SpO2 94 %, not currently breastfeeding.  Wt Readings from Last 1 Encounters:   05/12/17 78.3 kg (172 lb 11.2 oz)     General: Alert, awake, no " acute distress.  HEENT: Normocephalic, atraumatic, eyes anicteric and without scleral injection, EOMI, MMM.  Cardiac: RRR, normal S1, S2.  No m/g/r. No LE edema.  Pulmonary: Normal chest rise, normal work of breathing.  Lungs CTAB with faint wheezing but no crackles or rhonchi.  Abdomen: soft, non-tender, non-distended.  Normoactive BS.  No guarding or rebound tenderness.  Extremities: no deformities.  Warm, well perfused.  Skin: no rashes or lesions noted.  Warm and Dry.  Neuro: No focal deficits noted.  Speech clear.  Coordination and strength grossly normal.  Psych: Appropriate affect. Alert and oriented x3    Procedures other than Imaging:  Nebulizer Therapy  Initiation of SQ insulin therapy     Imaging:  Results for orders placed or performed during the hospital encounter of 05/12/17   XR Chest 2 Views    Narrative    XR CHEST 2 VW 5/12/2017 12:57 PM    HISTORY: Chest pain.    COMPARISON: None.    FINDINGS: Mild streaky basilar opacity could reflect atelectasis or  pneumonia. No pleural effusion or pneumothorax. Normal heart size.      Impression    IMPRESSION: Streaky bibasilar opacity suggests atelectasis. Pneumonia  could appear similar.    DENILSON ACKERMAN MD        Consultations:  Consultations This Hospital Stay   CARE COORDINATOR IP CONSULT  PHARMACY DISCHARGE EDUCATION BY PHARMACIST     Recent Lab Results:    Recent Labs  Lab 05/14/17  0613 05/13/17  0619 05/12/17  1215   WBC 12.4* 17.9* 23.3*   HGB 10.6* 10.4* 11.5*   HCT 34.0* 31.4* 34.1*   MCV 91 89 87    214 259       Recent Labs  Lab 05/13/17  0619 05/12/17  1215    128*   POTASSIUM 3.6 4.6   CHLORIDE 101 92*   CO2 27 28   ANIONGAP 7 8   * 352*   BUN 6* 10   CR 0.46* 0.60   GFRESTIMATED >90Non  GFR Calc >90Non  GFR Calc   GFRESTBLACK >90African American GFR Calc >90African American GFR Calc   ERENDIRA 9.1 9.2       Recent Labs  Lab 05/15/17  0826 05/15/17  0135 05/14/17  2136 05/14/17  1707 05/14/17  1231   05/13/17  0619  05/12/17  1215   GLC  --   --   --   --   --   --  220*  --  352*   * 211* 309* 266* 253*  < >  --   < >  --    < > = values in this interval not displayed.       Pending Results:    Unresulted Labs Ordered in the Past 30 Days of this Admission     Date and Time Order Name Status Description    5/12/2017 1841 Blood culture Preliminary     5/12/2017 1841 Blood culture Preliminary            Discharge Instructions and Follow-Up:   Discharge Orders     Reason for your hospital stay   You were hospitalized for pneumonia.     Follow-up and recommended labs and tests    Follow up in the next week with your primary care doctor as well as diabetic educator.     Activity   Your activity upon discharge: activity as tolerated     Full Code     Diet   Follow this diet upon discharge: Orders Placed This Encounter     Combination Diet Regular Diet Adult; 4797-3724 Calories: Moderate Consistent CHO (4-6 CHO units/meal)       Discharge Medications   Current Discharge Medication List      START taking these medications    Details   guaiFENesin (ROBITUSSIN) 20 mg/mL SOLN solution Take 10 mLs by mouth every 4 hours as needed for cough  Qty: 473 mL, Refills: 0    Associated Diagnoses: Community acquired pneumonia      amoxicillin (AMOXIL) 500 MG capsule Take 1 capsule (500 mg) by mouth every 8 hours  Qty: 11 capsule, Refills: 0    Associated Diagnoses: Community acquired pneumonia      azithromycin (ZITHROMAX) 250 MG tablet Take 1 tablet (250 mg) by mouth daily for 1 day  Qty: 1 tablet, Refills: 0    Associated Diagnoses: Community acquired pneumonia      insulin glargine (LANTUS SOLOSTAR) 100 UNIT/ML injection Inject 25 Units Subcutaneous At Bedtime Lantus Solostar Pen  Qty: 9 mL, Refills: 0    Associated Diagnoses: Type 2 diabetes mellitus with hyperglycemia, without long-term current use of insulin (H)      insulin pen needle 31G X 8 MM 1 Box of 100 insulin pen needles to be dispensed with every insulin pen  prescription  Qty: 100 each, Refills: 0    Associated Diagnoses: Type 2 diabetes mellitus with hyperglycemia, without long-term current use of insulin (H)         CONTINUE these medications which have CHANGED    Details   albuterol (PROAIR HFA/PROVENTIL HFA/VENTOLIN HFA) 108 (90 BASE) MCG/ACT Inhaler Inhale 2 puffs into the lungs every 6 hours  Qty: 1 Inhaler, Refills: 0    Associated Diagnoses: Community acquired pneumonia         CONTINUE these medications which have NOT CHANGED    Details   venlafaxine (EFFEXOR-XR) 75 MG 24 hr capsule Take 75 mg by mouth daily      fluticasone-salmeterol (ADVAIR) 500-50 MCG/DOSE diskus inhaler Inhale 1 puff into the lungs every 12 hours      METOPROLOL TARTRATE PO Take 100 mg by mouth 2 times daily      VITAMIN D, CHOLECALCIFEROL, PO Take 1,000 Units by mouth daily      EPINEPHrine 0.3 MG/0.3ML injection Inject 0.3 mg into the muscle as needed for anaphylaxis      Acetaminophen (TYLENOL PO) Take 500 mg by mouth every 6 hours as needed for mild pain or fever      clotrimazole-betamethasone (LOTRISONE) cream Apply topically 2 times daily as needed      ipratropium - albuterol 0.5 mg/2.5 mg/3 mL (DUONEB) 0.5-2.5 (3) MG/3ML neb solution Take 1 vial by nebulization every 6 hours as needed for shortness of breath / dyspnea or wheezing      hyoscyamine (LEVSIN/SL) 0.125 MG sublingual tablet Place 0.125 mg under the tongue every 4 hours as needed for cramping      Simethicone (GAS-X PO) Take 160 mg by mouth daily      PRAVASTATIN SODIUM PO Take 20 mg by mouth every evening      METHOCARBAMOL PO Take 500 mg by mouth 4 times daily as needed for muscle spasms      loratadine (CLARITIN) 10 MG tablet Take 10 mg by mouth daily as needed for allergies      Coenzyme Q10 (COQ10 PO) Take 1 tablet by mouth every evening       METFORMIN HCL PO Take 1,500 mg by mouth       GLIPIZIDE PO Take 10 mg by mouth every morning (before breakfast)       ibuprofen (ADVIL,MOTRIN) 800 MG tablet Take 1 tablet by  mouth every 8 hours as needed for pain.  Qty: 30 tablet, Refills: 1    Associated Diagnoses: S/P abdominal hysterectomy      Multiple Vitamins-Minerals (MULTIVITAMIN & MINERAL PO) Take 1 tablet by mouth daily.      loperamide (IMODIUM A-D) 2 MG tablet Take 2 mg by mouth 4 times daily as needed.         STOP taking these medications       aspirin 81 MG tablet Comments:   Reason for Stopping:             Time Spent on this Encounter   I, Jessica Park, personally saw the patient today and spent greater than 30 minutes discharging this patient.    Jessica Park MD

## 2017-05-16 NOTE — PROGRESS NOTES
Transition Communication Hand-off for Care Transitions to Next Level of Care Provider    Name: Lauren Doe  MRN #: [id number]  Primary Care Provider: Emily Saldana  Primary Care MD Name: Dr. Emily Saldana  Primary Clinic: [facility] [address] [place] [address]  Primary Care Clinic Name: [facility] [place]  Reason for Hospitalization:  Community acquired pneumonia [J18.9]  Hypoxia [R09.02]  Admit Date/Time: 5/12/2017 11:09 AM  Discharge Date: 5/15/17  Payor Source: Payor: BCBS / Plan: BCBS OUT OF STATE / Product Type: Indemnity /     Readmission Assessment Measure (PERCY) Risk Score/category: LOW           Reason for Communication Hand-off Referral: Admission diagnoses: PN  Other uncontrolled DM    Discharge Plan:       Concern for non-adherence with plan of care:   NO  Discharge Needs Assessment:  Needs       Most Recent Value    Equipment Currently Used at Home none    Transportation Available car, family or friend will provide    # of Referrals Placed by [facility] Care Coordination, Scheduled Follow-up appointments          Already enrolled in Tele-monitoring program and name of program:  NA  Follow-up specialty is recommended: Yes    Follow-up plan:  No future appointments.    Any outstanding tests or procedures:              Activity      Your activity upon discharge: activity as tolerated            Diet      Follow this diet upon discharge: Orders Placed This Encounter       Combination Diet Regular Diet Adult; [id number] Calories: Moderate Consistent CHO (4-6 CHO units/meal)                     Follow-up Appointments      Follow-up and recommended labs and tests       Follow up in the next week with your primary care doctor as well as diabetic educator.                     Further instructions from your care team      You have a follow up appointment on Thursday, May 18, 2017 at 9:40am with Dr. Emily Saldana at the [facility] in [place].      1. Follow  up with the diabetic educators within one week.  2. Take Lantus each night. Check your glucose three times daily and record these readings, bring these to your appointment.         Key Recommendations:  PNA, a1c 9.9 as of 5/13/17.  Needs f/u    Rosa M Carmichael [phone]  Sent via [employer] to Dr Saldana's RN CTS     AVS/Discharge Summary is the source of truth; this is a helpful guide for improved communication of patient story

## 2017-05-18 LAB
BACTERIA SPEC CULT: NO GROWTH
BACTERIA SPEC CULT: NO GROWTH
Lab: NORMAL
Lab: NORMAL
MICRO REPORT STATUS: NORMAL
MICRO REPORT STATUS: NORMAL
SPECIMEN SOURCE: NORMAL
SPECIMEN SOURCE: NORMAL

## 2019-06-28 NOTE — PROGRESS NOTES
[facility]  Hospitalist Progress Note  Jessica Park MD     Date of Service (when I saw the patient): 05/14/2017    Reason for Stay (Diagnosis): SOB         Assessment and Plan:      Summary of Stay: Lauren Doe is a 57 year old female with past medical history of asthma, DM2 and HTN who was admitted on 5/12/2017 with cough and SOB for approximately 10 days.  She was treated for possible RAD and viral URI with steroids but did not improve.  She was found to have likely CAP on admission, slowly improving with antibiotics.  Has also had significant hyperglycemia and started on insulin.    Problem List/Assessment and Plan:     1. Suspected community-acquired pneumonia: Patient's chest x-ray shows streaky bibasilar opacities concerning for atelectasis versus pneumonia. This is in the context of a leukocytosis (23.3) and fever. She has had fairly refractory symptoms over the past 10 days despite steroid treatment. She has not received any antibiotics prior to this admission. Her pneumonia could be viral versus bacterial. She was started on ceftriaxone and azithromycin. Procalcitonin level was elevated to 1.63.  Her blood cultures have remained negative.  She is slowly improving, continue with current antibiotics.  Leukocytosis improving.  - Continue Ceftriaxone and Azithromycin  - Breo Ellipta     2. Acute hypoxic respiratory failure: Patient is mildly hypoxic in the emergency department. She probably has some degree of chronic lung obstructive disease related to her asthma versus COPD. Nevertheless her current respiratory issues are compounding the issue. Her D-dimer is negative so a CT is of low utility at this point and PE seems rather unlikely. She is improving, currently off supplemental oxygen.  Treatment for PNA as above.  No significant wheezing so no further steroids.  Xopenex PRN given tachycardia.    3. Non-insulin dependent type 2 diabetes mellitus: Patient has been significantly  "hyperglycemic over the past several days due to steroid use. She is not on insulin at home. PTA she was on Metformin and Glipizide.  Her HgbA1C is elevated to 9.9 so she had poor control prior to steroids.  She was started on Lantus 20 units on admission, still hyperglycemic so will increase this today.    - Increase Lantus to 25 units QHS  - Hold Metformin for risk of Lactic acidosis with nebs and respiratory failure  - Continue PTA Glipizide  - Medium SSI    4. Hypertension: We will continue the patient's Toprol-XL at 100 mg daily.     5. Sinus tachycardia: Likely due to nebulizers as well as clinical dehydration and respiratory distress. We will watch her closely on telemetry. Troponin is undetectable and this seems o risk for any cardiac issues.     6. Depression: Continue Effexor.    DVT Prophylaxis: Pneumatic Compression Devices  Code Status: Full Code  Discharge Dispo: Home  Estimated Disch Date / # of Days until Disch: tomorrow as long as respiratory status improving        Interval History (Subjective):      Patient was seen with her bedside nurse this morning.  She states she had been feeling ok but when she went to the bathroom had a coughing fit and felt quite SOB.  Her HR also increased to 140.  Overall she feels better than admission but still not back to her baseline.  Cough is productive of greenish sputum.  No nausea, emesis or abdominal pain.                  Physical Exam:      Last Vital Signs:  /82 (BP Location: Right arm)  Pulse 126  Temp 96.5  F (35.8  C) (Axillary)  Resp 16  Ht 1.575 m (5' 2\")  Wt 78.3 kg (172 lb 11.2 oz)  LMP 08/25/2012  SpO2 96%  BMI 31.59 kg/m2    General: Alert, awake, no acute distress.  HEENT: Normocephalic and atraumatic, eyes anicteric and without scleral injection, EOMI, face symmetric, MMM.  Cardiac: RRR, normal S1, S2. No m/g/r, no LE edema.  Pulmonary: Normal chest rise, normal work of breathing.  Moving air bilaterally but rhonchi diffusely in the " posterior lung fields  Abdomen: soft, non-tender, non-distended.  Normoactive bowel sounds, no guarding or rebound tenderness.  Extremities: no deformities.  Warm, well perfused.  Skin: no rashes or lesions.  Warm and Dry.  Neuro: No focal deficits.  Speech clear.  Coordination and strength grossly normal.  Psych: Alert and oriented x3. Appropriate affect.         Medications:      All current medications were reviewed with changes reflected in problem list.         Data:      All new lab and imaging data was reviewed.   Labs:    Recent Labs  Lab 05/13/17  0619 05/12/17  1215    128*   POTASSIUM 3.6 4.6   CHLORIDE 101 92*   CO2 27 28   ANIONGAP 7 8   * 352*   BUN 6* 10   CR 0.46* 0.60   GFRESTIMATED >90Non  GFR Calc >90Non  GFR Calc   GFRESTBLACK >90African American GFR Calc >90African American GFR Calc   ERENDIRA 9.1 9.2       Recent Labs  Lab 05/14/17  0613 05/13/17  0619 05/12/17  1215   WBC 12.4* 17.9* 23.3*   HGB 10.6* 10.4* 11.5*   HCT 34.0* 31.4* 34.1*   MCV 91 89 87    214 259      Imaging:   No results found for this or any previous visit (from the past 24 hour(s)).    Jessica Park MD.            75